# Patient Record
Sex: FEMALE | Race: WHITE | Employment: OTHER | URBAN - NONMETROPOLITAN AREA
[De-identification: names, ages, dates, MRNs, and addresses within clinical notes are randomized per-mention and may not be internally consistent; named-entity substitution may affect disease eponyms.]

---

## 2017-01-05 LAB
BUN BLDV-MCNC: NORMAL MG/DL
CALCIUM SERPL-MCNC: NORMAL MG/DL
CHLORIDE BLD-SCNC: NORMAL MMOL/L
CHOLESTEROL, TOTAL: 160 MG/DL
CHOLESTEROL/HDL RATIO: 2.4
CO2: NORMAL MMOL/L
CREAT SERPL-MCNC: NORMAL MG/DL
GFR CALCULATED: NORMAL
GLUCOSE BLD-MCNC: 93 MG/DL
HDLC SERPL-MCNC: 68 MG/DL (ref 35–70)
LDL CHOLESTEROL CALCULATED: 69.2 MG/DL (ref 0–160)
POTASSIUM SERPL-SCNC: NORMAL MMOL/L
SODIUM BLD-SCNC: NORMAL MMOL/L
TRIGL SERPL-MCNC: 114 MG/DL
VLDLC SERPL CALC-MCNC: 23 MG/DL

## 2017-08-29 VITALS
DIASTOLIC BLOOD PRESSURE: 82 MMHG | HEART RATE: 74 BPM | SYSTOLIC BLOOD PRESSURE: 126 MMHG | HEIGHT: 61 IN | WEIGHT: 225 LBS | BODY MASS INDEX: 42.48 KG/M2

## 2017-08-29 DIAGNOSIS — I10 UNSPECIFIED ESSENTIAL HYPERTENSION: ICD-10-CM

## 2017-08-29 DIAGNOSIS — M1A.9XX1 TOPHI: ICD-10-CM

## 2017-08-29 PROBLEM — E78.5 HYPERLIPIDEMIA: Status: ACTIVE | Noted: 2017-08-29

## 2017-08-29 PROBLEM — E03.9 HYPOTHYROIDISM: Status: ACTIVE | Noted: 2017-08-29

## 2017-08-29 PROBLEM — R32 URINARY INCONTINENCE: Status: ACTIVE | Noted: 2017-08-29

## 2017-08-29 RX ORDER — ASPIRIN 325 MG
325 TABLET ORAL DAILY
COMMUNITY
End: 2020-06-17

## 2017-08-29 RX ORDER — QUINAPRIL HCL AND HYDROCHLOROTHIAZIDE 20; 25 MG/1; MG/1
1 TABLET ORAL 2 TIMES DAILY
COMMUNITY
End: 2017-09-01 | Stop reason: SDUPTHER

## 2017-08-29 RX ORDER — IBUPROFEN 200 MG
1 CAPSULE ORAL DAILY
COMMUNITY
End: 2021-04-29

## 2017-08-29 RX ORDER — DULOXETIN HYDROCHLORIDE 60 MG/1
60 CAPSULE, DELAYED RELEASE ORAL DAILY
COMMUNITY
End: 2018-01-03 | Stop reason: SDUPTHER

## 2017-08-29 RX ORDER — ATORVASTATIN CALCIUM 10 MG/1
10 TABLET, FILM COATED ORAL DAILY
COMMUNITY
End: 2017-12-11 | Stop reason: SDUPTHER

## 2017-08-29 RX ORDER — MECLIZINE HYDROCHLORIDE 25 MG/1
25 TABLET ORAL PRN
COMMUNITY
End: 2021-01-14 | Stop reason: SDUPTHER

## 2017-08-29 RX ORDER — ALLOPURINOL 300 MG/1
300 TABLET ORAL DAILY
COMMUNITY
End: 2017-09-01

## 2017-08-29 RX ORDER — SENNOSIDES 8.6 MG
650 CAPSULE ORAL EVERY 8 HOURS PRN
COMMUNITY
End: 2017-09-01

## 2017-08-29 RX ORDER — LEVOTHYROXINE SODIUM 0.1 MG/1
100 TABLET ORAL DAILY
COMMUNITY
End: 2017-09-01 | Stop reason: SDUPTHER

## 2017-09-01 ENCOUNTER — OFFICE VISIT (OUTPATIENT)
Dept: FAMILY MEDICINE CLINIC | Age: 82
End: 2017-09-01
Payer: MEDICARE

## 2017-09-01 VITALS
DIASTOLIC BLOOD PRESSURE: 80 MMHG | BODY MASS INDEX: 42.89 KG/M2 | HEART RATE: 62 BPM | SYSTOLIC BLOOD PRESSURE: 114 MMHG | WEIGHT: 227 LBS

## 2017-09-01 DIAGNOSIS — M1A.09X0 CHRONIC GOUT OF MULTIPLE SITES, UNSPECIFIED CAUSE: ICD-10-CM

## 2017-09-01 DIAGNOSIS — Z13.1 SCREENING FOR DIABETES MELLITUS: Primary | ICD-10-CM

## 2017-09-01 DIAGNOSIS — I10 ESSENTIAL HYPERTENSION: ICD-10-CM

## 2017-09-01 DIAGNOSIS — E03.9 HYPOTHYROIDISM (ACQUIRED): ICD-10-CM

## 2017-09-01 DIAGNOSIS — G45.9 TRANSIENT CEREBRAL ISCHEMIA, UNSPECIFIED TYPE: ICD-10-CM

## 2017-09-01 DIAGNOSIS — M1A.9XX1 TOPHI: ICD-10-CM

## 2017-09-01 PROCEDURE — 1123F ACP DISCUSS/DSCN MKR DOCD: CPT | Performed by: FAMILY MEDICINE

## 2017-09-01 PROCEDURE — 4040F PNEUMOC VAC/ADMIN/RCVD: CPT | Performed by: FAMILY MEDICINE

## 2017-09-01 PROCEDURE — G0008 ADMIN INFLUENZA VIRUS VAC: HCPCS | Performed by: FAMILY MEDICINE

## 2017-09-01 PROCEDURE — 1036F TOBACCO NON-USER: CPT | Performed by: FAMILY MEDICINE

## 2017-09-01 PROCEDURE — G8427 DOCREV CUR MEDS BY ELIG CLIN: HCPCS | Performed by: FAMILY MEDICINE

## 2017-09-01 PROCEDURE — G8400 PT W/DXA NO RESULTS DOC: HCPCS | Performed by: FAMILY MEDICINE

## 2017-09-01 PROCEDURE — 99214 OFFICE O/P EST MOD 30 MIN: CPT | Performed by: FAMILY MEDICINE

## 2017-09-01 PROCEDURE — 1090F PRES/ABSN URINE INCON ASSESS: CPT | Performed by: FAMILY MEDICINE

## 2017-09-01 PROCEDURE — G8417 CALC BMI ABV UP PARAM F/U: HCPCS | Performed by: FAMILY MEDICINE

## 2017-09-01 PROCEDURE — 90662 IIV NO PRSV INCREASED AG IM: CPT | Performed by: FAMILY MEDICINE

## 2017-09-01 RX ORDER — LEVOTHYROXINE SODIUM 0.1 MG/1
100 TABLET ORAL DAILY
Qty: 90 TABLET | Refills: 3 | Status: SHIPPED | OUTPATIENT
Start: 2017-09-01 | End: 2018-03-26 | Stop reason: SDUPTHER

## 2017-09-01 RX ORDER — COLCHICINE 0.6 MG/1
0.6 CAPSULE ORAL DAILY
Qty: 30 CAPSULE | Refills: 5 | Status: SHIPPED | OUTPATIENT
Start: 2017-09-01 | End: 2018-11-09

## 2017-09-01 RX ORDER — QUINAPRIL HCL AND HYDROCHLOROTHIAZIDE 20; 25 MG/1; MG/1
1 TABLET ORAL 2 TIMES DAILY
Qty: 180 TABLET | Refills: 3 | Status: SHIPPED | OUTPATIENT
Start: 2017-09-01 | End: 2017-10-18 | Stop reason: DRUGHIGH

## 2017-09-01 ASSESSMENT — PATIENT HEALTH QUESTIONNAIRE - PHQ9
1. LITTLE INTEREST OR PLEASURE IN DOING THINGS: 0
SUM OF ALL RESPONSES TO PHQ QUESTIONS 1-9: 0
2. FEELING DOWN, DEPRESSED OR HOPELESS: 0
SUM OF ALL RESPONSES TO PHQ9 QUESTIONS 1 & 2: 0

## 2017-09-01 ASSESSMENT — ENCOUNTER SYMPTOMS
SHORTNESS OF BREATH: 0
BLURRED VISION: 0

## 2017-10-18 ENCOUNTER — OFFICE VISIT (OUTPATIENT)
Dept: FAMILY MEDICINE CLINIC | Age: 82
End: 2017-10-18
Payer: MEDICARE

## 2017-10-18 VITALS
HEIGHT: 62 IN | BODY MASS INDEX: 43.43 KG/M2 | HEART RATE: 107 BPM | TEMPERATURE: 98.2 F | WEIGHT: 236 LBS | SYSTOLIC BLOOD PRESSURE: 104 MMHG | DIASTOLIC BLOOD PRESSURE: 70 MMHG

## 2017-10-18 DIAGNOSIS — N95.0 POSTMENOPAUSAL BLEEDING: Primary | ICD-10-CM

## 2017-10-18 DIAGNOSIS — R42 DIZZINESS: ICD-10-CM

## 2017-10-18 DIAGNOSIS — I10 ESSENTIAL HYPERTENSION: ICD-10-CM

## 2017-10-18 LAB
BASOPHILS # BLD: 0.12 THOU/MM3
BASOPHILS ABSOLUTE: ABNORMAL /ΜL
BASOPHILS RELATIVE PERCENT: ABNORMAL %
DIFFERENTIAL: AUTOMATED DIFF
EOSINOPHIL # BLD: 0.16 THOU/MM3
EOSINOPHILS ABSOLUTE: ABNORMAL /ΜL
EOSINOPHILS RELATIVE PERCENT: ABNORMAL %
HCT VFR BLD CALC: 51.2 %
HCT VFR BLD CALC: 51.2 % (ref 36–46)
HEMOGLOBIN: 16 G/DL
HEMOGLOBIN: 16 G/DL (ref 12–16)
LYMPHOCYTES # BLD: 2.74 THOU/MM3
LYMPHOCYTES ABSOLUTE: ABNORMAL /ΜL
LYMPHOCYTES RELATIVE PERCENT: ABNORMAL %
MCH RBC QN AUTO: 28.3 PG
MCH RBC QN AUTO: 28.5 PG
MCHC RBC AUTO-ENTMCNC: 31.3 G/DL
MCHC RBC AUTO-ENTMCNC: 31.3 G/DL
MCV RBC AUTO: 91 FL
MCV RBC AUTO: 91 FL
MONOCYTES # BLD: 0.83 THOU/MM3
MONOCYTES ABSOLUTE: ABNORMAL /ΜL
MONOCYTES RELATIVE PERCENT: ABNORMAL %
NEUTROPHILS ABSOLUTE: ABNORMAL /ΜL
NEUTROPHILS RELATIVE PERCENT: ABNORMAL %
NEUTROPHILS: 4.04 THOU/MM3
PDW BLD-RTO: 13.3 %
PDW BLD-RTO: ABNORMAL %
PLATELET # BLD: 434 K/ΜL
PLATELET # BLD: 434 THOU/MM3
PMV BLD AUTO: 6.4 FL
PMV BLD AUTO: ABNORMAL FL
RBC # BLD: 5.62 10^6/ΜL
RBC # BLD: 5.62 M/UL
WBC # BLD: 7.9 10^3/ML
WBC # BLD: 7.9 THOU/ML3

## 2017-10-18 PROCEDURE — 1123F ACP DISCUSS/DSCN MKR DOCD: CPT | Performed by: FAMILY MEDICINE

## 2017-10-18 PROCEDURE — G8400 PT W/DXA NO RESULTS DOC: HCPCS | Performed by: FAMILY MEDICINE

## 2017-10-18 PROCEDURE — 1036F TOBACCO NON-USER: CPT | Performed by: FAMILY MEDICINE

## 2017-10-18 PROCEDURE — 4040F PNEUMOC VAC/ADMIN/RCVD: CPT | Performed by: FAMILY MEDICINE

## 2017-10-18 PROCEDURE — G8427 DOCREV CUR MEDS BY ELIG CLIN: HCPCS | Performed by: FAMILY MEDICINE

## 2017-10-18 PROCEDURE — G8484 FLU IMMUNIZE NO ADMIN: HCPCS | Performed by: FAMILY MEDICINE

## 2017-10-18 PROCEDURE — G8417 CALC BMI ABV UP PARAM F/U: HCPCS | Performed by: FAMILY MEDICINE

## 2017-10-18 PROCEDURE — 1090F PRES/ABSN URINE INCON ASSESS: CPT | Performed by: FAMILY MEDICINE

## 2017-10-18 PROCEDURE — 99214 OFFICE O/P EST MOD 30 MIN: CPT | Performed by: FAMILY MEDICINE

## 2017-10-18 RX ORDER — QUINAPRIL HCL AND HYDROCHLOROTHIAZIDE 20; 25 MG/1; MG/1
1 TABLET ORAL DAILY
Qty: 180 TABLET | Refills: 3 | Status: SHIPPED
Start: 2017-10-18 | End: 2017-11-02 | Stop reason: SINTOL

## 2017-10-18 NOTE — PROGRESS NOTES
 Alcohol use Yes      Comment: socially      Current Outpatient Prescriptions   Medication Sig Dispense Refill    quinapril-hydrochlorothiazide (ACCURETIC) 20-25 MG per tablet Take 1 tablet by mouth daily 180 tablet 3    Omega-3 Fatty Acids (FISH OIL PO) Take 2 g by mouth      levothyroxine (SYNTHROID) 100 MCG tablet Take 1 tablet by mouth Daily 90 tablet 3    atorvastatin (LIPITOR) 10 MG tablet Take 10 mg by mouth daily      calcium carbonate (OYSTER SHELL CALCIUM 500 MG) 1250 (500 Ca) MG tablet Take 1 tablet by mouth daily      Multiple Vitamins-Minerals (CENTRUM SILVER PO) Take by mouth daily      aspirin 325 MG tablet Take 325 mg by mouth daily      meclizine (ANTIVERT) 25 MG tablet Take 25 mg by mouth as needed      DULoxetine (CYMBALTA) 60 MG extended release capsule Take 60 mg by mouth daily      Colchicine 0.6 MG CAPS Take 1 capsule by mouth daily 30 capsule 5     No current facility-administered medications for this visit. Allergies   Allergen Reactions    Tramadol      dizziness    Vesicare [Solifenacin]      Swollen fingers       Health Maintenance   Topic Date Due    DTaP/Tdap/Td vaccine (1 - Tdap) 07/28/1953    Zostavax vaccine  Completed    DEXA (modify frequency per FRAX score)  Completed    Flu vaccine  Completed    Pneumococcal low/med risk  Completed       Subjective:      Review of Systems   Constitutional: Negative for activity change, appetite change, chills, fatigue and unexpected weight change. Respiratory: Negative for chest tightness and shortness of breath. Cardiovascular: Negative for chest pain and leg swelling. Gastrointestinal: Negative for abdominal distention, abdominal pain, blood in stool, constipation, diarrhea and nausea. Genitourinary: Positive for vaginal bleeding. Negative for vaginal discharge.      Genitourinary: Positive for pelvic pain (crampy) and vaginal bleeding (abnormal vaginal bleeding, happened a few months back for a short time and just recently started up again with sometimes more than just spotting). Objective:     /70   Pulse 107   Temp 98.2 °F (36.8 °C) (Tympanic)   Ht 5' 2\" (1.575 m)   Wt 236 lb (107 kg)   PF 96 L/min   BMI 43.16 kg/m²     Physical Exam   Constitutional: She is oriented to person, place, and time. She appears well-developed and well-nourished. HENT:   Head: Normocephalic and atraumatic. Eyes: Conjunctivae and EOM are normal.   Neck: Normal range of motion. Neck supple. No JVD present. No thyromegaly present. Cardiovascular: Normal rate, regular rhythm and intact distal pulses. Exam reveals no gallop and no friction rub. No murmur heard. Pulmonary/Chest: Effort normal and breath sounds normal. No respiratory distress. Abdominal: Soft. She exhibits no distension and no mass. There is tenderness. There is no rebound and no guarding. Minimal suprapubic tenderness noted   Genitourinary: Vagina normal. There is no rash or lesion on the right labia. There is no rash or lesion on the left labia. No erythema, tenderness or bleeding in the vagina. No vaginal discharge found. Genitourinary Comments: No CMT, no obvious masses or polyps. Exam limited by body habitus but no discrete masses on the uterus, not enlarged, freely mobile and no adnexal masses or tenderness. No inguinal LAD noted   Musculoskeletal: She exhibits no edema. Many gouty Tophi noted on the hands   Lymphadenopathy:     She has no cervical adenopathy. Neurological: She is alert and oriented to person, place, and time. Skin: Skin is warm. Psychiatric: She has a normal mood and affect. Her behavior is normal. Judgment and thought content normal.   Nursing note and vitals reviewed. Assessment/Plan:     1. Postmenopausal bleeding  US Pelvis Complete    CBC Auto Differential- check in light of the dizziness and the bleed   2.  Essential hypertension  quinapril-hydrochlorothiazide (ACCURETIC) 20-25 MG per tablet- controlled,

## 2017-10-19 DIAGNOSIS — I10 ESSENTIAL HYPERTENSION: ICD-10-CM

## 2017-10-19 DIAGNOSIS — N95.0 POSTMENOPAUSAL BLEEDING: ICD-10-CM

## 2017-10-19 DIAGNOSIS — E03.9 HYPOTHYROIDISM (ACQUIRED): ICD-10-CM

## 2017-10-19 DIAGNOSIS — Z13.1 SCREENING FOR DIABETES MELLITUS: ICD-10-CM

## 2017-10-20 ENCOUNTER — TELEPHONE (OUTPATIENT)
Dept: FAMILY MEDICINE CLINIC | Age: 82
End: 2017-10-20

## 2017-10-20 DIAGNOSIS — N95.0 POSTMENOPAUSAL BLEEDING: Primary | ICD-10-CM

## 2017-10-20 DIAGNOSIS — R93.89 THICKENED ENDOMETRIUM: ICD-10-CM

## 2017-10-20 ASSESSMENT — ENCOUNTER SYMPTOMS
DIARRHEA: 0
ABDOMINAL PAIN: 0
ABDOMINAL DISTENTION: 0
BLOOD IN STOOL: 0
CHEST TIGHTNESS: 0
SHORTNESS OF BREATH: 0
CONSTIPATION: 0
NAUSEA: 0

## 2017-10-23 ENCOUNTER — TELEPHONE (OUTPATIENT)
Dept: FAMILY MEDICINE CLINIC | Age: 82
End: 2017-10-23

## 2017-10-23 NOTE — PROGRESS NOTES
I called Dr [facility] office and attempted to speak with her nurse but only got Nakita Ferguson LPN's voice mail. I left a detailed VM explaining  we needed the pt to be see by a local GYN provider for an endometrial bx and then if the bx was positive for cancer the pt could be referred back to us. I provded our office contact information for Nakita Ferguson to call our office if she had any further questions. I also left a VM for Julia Hughes the supervisor in scheduling that the pt should not be scheduled with our office at this time.

## 2017-10-23 NOTE — TELEPHONE ENCOUNTER
Tasha Guerra called waiting for a return call regarding who [facility] needs to see.      [phone]

## 2017-10-23 NOTE — TELEPHONE ENCOUNTER
Dr. Magdalena Lovelace is requesting that Paola Mendez have an endometrial biopsy by a regular Gynecologist before they see her due to Dr. Magdalena Lovelace having a limited schedule since she cut back her hours

## 2017-10-24 ENCOUNTER — OFFICE VISIT (OUTPATIENT)
Dept: FAMILY MEDICINE CLINIC | Age: 82
End: 2017-10-24

## 2017-10-24 VITALS
OXYGEN SATURATION: 97 % | DIASTOLIC BLOOD PRESSURE: 60 MMHG | SYSTOLIC BLOOD PRESSURE: 92 MMHG | WEIGHT: 229 LBS | BODY MASS INDEX: 41.88 KG/M2 | TEMPERATURE: 97.6 F | HEART RATE: 128 BPM

## 2017-10-24 DIAGNOSIS — E03.9 HYPOTHYROIDISM (ACQUIRED): ICD-10-CM

## 2017-10-24 DIAGNOSIS — R58 HYPOTENSION DUE TO BLOOD LOSS: ICD-10-CM

## 2017-10-24 DIAGNOSIS — I10 ESSENTIAL HYPERTENSION: ICD-10-CM

## 2017-10-24 DIAGNOSIS — I95.89 HYPOTENSION DUE TO BLOOD LOSS: ICD-10-CM

## 2017-10-24 DIAGNOSIS — N95.0 POSTMENOPAUSAL BLEEDING: Primary | ICD-10-CM

## 2017-10-24 DIAGNOSIS — R42 DIZZINESS: ICD-10-CM

## 2017-10-24 DIAGNOSIS — R93.89 THICKENED ENDOMETRIUM: ICD-10-CM

## 2017-10-24 LAB
AGE FOR GFR: 83
ANION GAP SERPL CALCULATED.3IONS-SCNC: 13 MMOL/L
APPEARANCE: ABNORMAL
BACTERIA: ABNORMAL 1HPF
BASOPHILS # BLD: 0.08 THOU/MM3
BILIRUBIN: ABNORMAL
BLOOD: ABNORMAL
BUN BLDV-MCNC: 18 MG/DL
CALCIUM SERPL-MCNC: 9.4 MG/DL
CASTS: ABNORMAL /LPF
CHLORIDE BLD-SCNC: 99 MMOL/L
CO2: 26 MMOL/L
COLOR: ABNORMAL
CREAT SERPL-MCNC: 0.7 MG/DL
CRYSTALS: ABNORMAL /HPF
DIFFERENTIAL: AUTOMATED DIFF
EGFR BF: 97 ML/MIN/1.73 M2
EGFR BM: 131 ML/MIN/1.73 M2
EGFR WF: 80 ML/MIN/1.73 M2
EGFR WM: 108 ML/MIN/1.73 M2
EOSINOPHIL # BLD: 0.08 THOU/MM3
EPITHELIAL CELLS, UA: ABNORMAL /HPF
GLUCOSE: 103 MG/DL
GLUCOSE: ABNORMAL MG/DL
HCT VFR BLD CALC: 37.4 %
HEMOGLOBIN: 12.3 G/DL
KETONES: ABNORMAL MG/DL
LEUKOCYTES, UA: ABNORMAL
LYMPHOCYTES # BLD: 1.1 THOU/MM3
MCH RBC QN AUTO: 29.5 PG
MCHC RBC AUTO-ENTMCNC: 32.8 G/DL
MCV RBC AUTO: 89.7 FL
MICROSCOPIC URINE: ABNORMAL
MONOCYTES # BLD: 0.5 THOU/MM3
MUCUS: ABNORMAL /HPF
NEUTROPHILS: 4.43 THOU/MM3
NITRITE, URINE: ABNORMAL
PDW BLD-RTO: 13 %
PH: 6 PH
PLATELET # BLD: 362 THOU/MM3
PMV BLD AUTO: 7 FL
POTASSIUM SERPL-SCNC: 3.7 MMOL/L
PROTEIN,SCREEN: ABNORMAL MG/DL
RBC # BLD: 4.17 M/UL
RBC: ABNORMAL /HPF
SODIUM BLD-SCNC: 134 MMOL/L
SPECIFIC GRAVITY, URINE: 1.01 MG/DL
URINE CULTURE, ROUTINE: NORMAL
UROBILINOGEN, URINE: 0.2 MG/DL
WBC # BLD: 6.19 THOU/ML3
WBC URINE: ABNORMAL
YEAST: ABNORMAL /HPF

## 2017-10-24 ASSESSMENT — ENCOUNTER SYMPTOMS
NAUSEA: 0
COUGH: 0
ABDOMINAL PAIN: 0
DIARRHEA: 0
CHEST TIGHTNESS: 0
CONSTIPATION: 0
SHORTNESS OF BREATH: 0

## 2017-10-24 NOTE — PROGRESS NOTES
She has tried nothing for the symptoms. There is no history of recurrent UTIs. BP Readings from Last 3 Encounters:   10/24/17 92/60   10/18/17 104/70   09/01/17 114/80          (goal 120/80)    Past Medical History:   Diagnosis Date    Goiter 2004    CT    HTN (hypertension)     Hyperlipidemia     Hypothyroidism (acquired)     TIA (transient ischemic attack)       Past Surgical History:   Procedure Laterality Date    BREAST BIOPSY Right 10/2008    benign    CATARACT REMOVAL Left 07/2010    COLONOSCOPY  2007    normal    FOOT SURGERY      bunions and hammertoes    KNEE ARTHROPLASTY Bilateral     LEEP  2002       Family History   Problem Relation Age of Onset    Other Other      polycythemia vera    Other Daughter      polycystic ovary       Social History   Substance Use Topics    Smoking status: Never Smoker    Smokeless tobacco: Never Used    Alcohol use Yes      Comment: socially      Current Outpatient Prescriptions   Medication Sig Dispense Refill    quinapril-hydrochlorothiazide (ACCURETIC) 20-25 MG per tablet Take 1 tablet by mouth daily 180 tablet 3    Omega-3 Fatty Acids (FISH OIL PO) Take 2 g by mouth      levothyroxine (SYNTHROID) 100 MCG tablet Take 1 tablet by mouth Daily 90 tablet 3    Colchicine 0.6 MG CAPS Take 1 capsule by mouth daily 30 capsule 5    atorvastatin (LIPITOR) 10 MG tablet Take 10 mg by mouth daily      calcium carbonate (OYSTER SHELL CALCIUM 500 MG) 1250 (500 Ca) MG tablet Take 1 tablet by mouth daily      Multiple Vitamins-Minerals (CENTRUM SILVER PO) Take by mouth daily      aspirin 325 MG tablet Take 325 mg by mouth daily      meclizine (ANTIVERT) 25 MG tablet Take 25 mg by mouth as needed      DULoxetine (CYMBALTA) 60 MG extended release capsule Take 60 mg by mouth daily       No current facility-administered medications for this visit.       Allergies   Allergen Reactions    Tramadol      dizziness    Vesicare [Solifenacin]      Swollen fingers Health Maintenance   Topic Date Due    DTaP/Tdap/Td vaccine (1 - Tdap) 07/28/1953    Zostavax vaccine  Completed    DEXA (modify frequency per FRAX score)  Completed    Flu vaccine  Completed    Pneumococcal low/med risk  Completed       Subjective:      Review of Systems   Constitutional: Positive for diaphoresis and fatigue. Negative for appetite change, chills, fever and unexpected weight change. Respiratory: Negative for cough, chest tightness and shortness of breath. Cardiovascular: Positive for palpitations. Negative for chest pain and leg swelling. Can tell her heart is going a bit faster than normal   Gastrointestinal: Negative for abdominal pain, constipation, diarrhea and nausea. Genitourinary: Positive for flank pain, frequency, hematuria and vaginal bleeding. Negative for vaginal discharge. Hematological: Does not bruise/bleed easily. no history of anesthetic reactions or previous bleeding issues. No history of blood clotting or DVT. Objective:     BP 92/60 (Site: Left Arm, Position: Standing)   Pulse 128   Temp 97.6 °F (36.4 °C) (Tympanic)   Wt 229 lb (103.9 kg)   SpO2 97%   BMI 41.88 kg/m²     Physical Exam   Constitutional: She is oriented to person, place, and time. She appears well-developed and well-nourished. HENT:   Head: Normocephalic and atraumatic. Eyes: Conjunctivae and EOM are normal.   Neck: Normal range of motion. Neck supple. No JVD present. No thyromegaly present. Cardiovascular: Normal rate, regular rhythm and intact distal pulses. Exam reveals no gallop and no friction rub. No murmur heard. Pulmonary/Chest: Effort normal and breath sounds normal. No respiratory distress. Abdominal: Soft. She exhibits no distension and no mass. There is tenderness. There is no rebound and no guarding. Minimal suprapubic tenderness noted, no CVA tenderness or guarding.  No focal tenderness on the flank with palpation   Genitourinary: Vagina normal. There given educational materials - see patient instructions. Discussed use, benefit, and side effects of prescribed medications. All patient questions answered. Pt voiced understanding. Reviewed health maintenance. Instructed to continue current medications, diet and exercise. Patient agreed with treatment plan. Follow up as directed.      Electronically signed by Gisell Forrest MD on 10/24/2017

## 2017-10-26 LAB
AGE FOR GFR: 83
ANION GAP SERPL CALCULATED.3IONS-SCNC: 10 MMOL/L
BASOPHILS # BLD: 0.02 THOU/MM3
BUN BLDV-MCNC: 19 MG/DL
CHLORIDE BLD-SCNC: 104 MMOL/L
CO2: 27 MMOL/L
CREAT SERPL-MCNC: 0.6 MG/DL
DIFFERENTIAL: AUTOMATED DIFF
EGFR BF: 116 ML/MIN/1.73 M2
EGFR BM: 156 ML/MIN/1.73 M2
EGFR WF: 95 ML/MIN/1.73 M2
EGFR WM: 129 ML/MIN/1.73 M2
EOSINOPHIL # BLD: 0 THOU/MM3
HCT VFR BLD CALC: 32.2 %
HEMOGLOBIN: 10.6 G/DL
LYMPHOCYTES # BLD: 1.36 THOU/MM3
MCH RBC QN AUTO: 29.4 PG
MCHC RBC AUTO-ENTMCNC: 33.1 G/DL
MCV RBC AUTO: 88.9 FL
MONOCYTES # BLD: 0.47 THOU/MM3
NEUTROPHILS: 4.84 THOU/MM3
PDW BLD-RTO: 12.9 %
PLATELET # BLD: 325 THOU/MM3
PMV BLD AUTO: 6.5 FL
POTASSIUM SERPL-SCNC: 3.8 MMOL/L
RBC # BLD: 3.62 M/UL
SODIUM BLD-SCNC: 137 MMOL/L
WBC # BLD: 6.7 THOU/ML3

## 2017-10-27 ENCOUNTER — TELEPHONE (OUTPATIENT)
Dept: FAMILY MEDICINE CLINIC | Age: 82
End: 2017-10-27

## 2017-10-27 NOTE — TELEPHONE ENCOUNTER
Eliot Kirk called to get a hospital fu appointment with Dr Ed Drake. It is scheduled for 11/2/17 at 10:45. She will need a TCM call. [address] d/c 10/26/17.  Had D&C??

## 2017-10-31 ENCOUNTER — TELEPHONE (OUTPATIENT)
Dept: FAMILY MEDICINE CLINIC | Age: 82
End: 2017-10-31

## 2017-10-31 NOTE — TELEPHONE ENCOUNTER
Was wondering if she should take her ATB for 5 days or 10 days stated that she has enough for about 5 more days, she thought for 5 days

## 2017-11-02 ENCOUNTER — OFFICE VISIT (OUTPATIENT)
Dept: FAMILY MEDICINE CLINIC | Age: 82
End: 2017-11-02
Payer: MEDICARE

## 2017-11-02 VITALS
TEMPERATURE: 97.1 F | DIASTOLIC BLOOD PRESSURE: 74 MMHG | SYSTOLIC BLOOD PRESSURE: 118 MMHG | BODY MASS INDEX: 41.34 KG/M2 | OXYGEN SATURATION: 96 % | WEIGHT: 226 LBS | HEART RATE: 86 BPM

## 2017-11-02 DIAGNOSIS — I10 ESSENTIAL HYPERTENSION: ICD-10-CM

## 2017-11-02 DIAGNOSIS — N95.0 POSTMENOPAUSAL VAGINAL BLEEDING: Primary | ICD-10-CM

## 2017-11-02 DIAGNOSIS — N39.41 URGE INCONTINENCE OF URINE: ICD-10-CM

## 2017-11-02 DIAGNOSIS — D50.0 ANEMIA, BLOOD LOSS: ICD-10-CM

## 2017-11-02 PROCEDURE — 99495 TRANSJ CARE MGMT MOD F2F 14D: CPT | Performed by: FAMILY MEDICINE

## 2017-11-02 RX ORDER — QUINAPRIL 10 MG/1
10 TABLET ORAL DAILY
Qty: 30 TABLET | Refills: 3 | Status: SHIPPED | OUTPATIENT
Start: 2017-11-02 | End: 2018-02-28 | Stop reason: SDUPTHER

## 2017-11-02 NOTE — PROGRESS NOTES
Transition of Care Note   HPI  Mabelshaun Lo presents for face-to-face visit 11/2/17 for follow up from hospitalization for postmenopausal bleeding and UTI. Initial discharge date: 10/26. Interim history: Has been doing well since she has been home. No fevers or chills, no further bleeding since home from the hospital at all. HAs been minimally dizzy on occasion but if she stands slow she is fine    Date of Post Discharge communication contact: 11/1    Communication within 2 business dates of Initial Discharge (via direct contact, phone, or electronic OR 2 documented attempts unsuccessful:   [] No   [x] Yes     Persons at visit: daughter and Patient    Activity: activity as tolerated    Any medication or treatment changes since post-hospitalization phone call? [] No   [] Yes        Any further education needed on medications/treatment plan? [] No   [] Yes       All Active Meds in Chart - may or may not be currently taking post-hospital  Current Outpatient Prescriptions   Medication Sig Dispense Refill    quinapril (ACCUPRIL) 10 MG tablet Take 1 tablet by mouth daily 30 tablet 3    levothyroxine (SYNTHROID) 100 MCG tablet Take 1 tablet by mouth Daily 90 tablet 3    Colchicine 0.6 MG CAPS Take 1 capsule by mouth daily 30 capsule 5    atorvastatin (LIPITOR) 10 MG tablet Take 10 mg by mouth daily      calcium carbonate (OYSTER SHELL CALCIUM 500 MG) 1250 (500 Ca) MG tablet Take 1 tablet by mouth daily      Multiple Vitamins-Minerals (CENTRUM SILVER PO) Take by mouth daily      meclizine (ANTIVERT) 25 MG tablet Take 25 mg by mouth as needed      DULoxetine (CYMBALTA) 60 MG extended release capsule Take 60 mg by mouth daily      Omega-3 Fatty Acids (FISH OIL PO) Take 2 g by mouth      aspirin 325 MG tablet Take 325 mg by mouth daily       No current facility-administered medications for this visit.         Current Medications (meds in record marked as taking per [facility] phone call)  Outpatient Prescriptions Dr. Wade Lozada for this as needed        Outstanding results or need for additional testing/tratment/referral resources:   [] No   [x] Yes   Is supposed to return to Wade Barker to discuss possilby cystocele repair  Requested/reviewed: Yes    Disease Education:  None      [address] :  None      Note:  CPT Coding - [x] Moderate Complexity: seen within 7-14 business days (63497)                                      [] Severe Complexity: seen within 7 business days (82591)

## 2017-11-10 ENCOUNTER — TELEPHONE (OUTPATIENT)
Dept: FAMILY MEDICINE CLINIC | Age: 82
End: 2017-11-10

## 2017-11-10 NOTE — TELEPHONE ENCOUNTER
Finished aTB for UTI last Sunday, now noticed a little blood during the night a couple times. Not as bad this am.  Wonders if needs more ATB or what you would like to do.     Did have D&C couple weeks ago

## 2017-11-10 NOTE — TELEPHONE ENCOUNTER
Needs a UA before the weekend-- can come to walkins in the morning or to the lab-- EARLY this afternoon

## 2017-11-11 ENCOUNTER — OFFICE VISIT (OUTPATIENT)
Dept: FAMILY MEDICINE CLINIC | Age: 82
End: 2017-11-11
Payer: MEDICARE

## 2017-11-11 VITALS
WEIGHT: 231 LBS | HEART RATE: 88 BPM | DIASTOLIC BLOOD PRESSURE: 68 MMHG | TEMPERATURE: 98.8 F | SYSTOLIC BLOOD PRESSURE: 128 MMHG | BODY MASS INDEX: 42.25 KG/M2

## 2017-11-11 DIAGNOSIS — N39.0 RECURRENT UTI: ICD-10-CM

## 2017-11-11 DIAGNOSIS — N81.10 BLADDER PROLAPSE, FEMALE, ACQUIRED: ICD-10-CM

## 2017-11-11 DIAGNOSIS — R31.9 HEMATURIA, UNSPECIFIED TYPE: Primary | ICD-10-CM

## 2017-11-11 LAB
BILIRUBIN, POC: NEGATIVE
BLOOD URINE, POC: ABNORMAL
CLARITY, POC: ABNORMAL
COLOR, POC: YELLOW
GLUCOSE URINE, POC: NEGATIVE
KETONES, POC: NEGATIVE
LEUKOCYTE EST, POC: NEGATIVE
NITRITE, POC: ABNORMAL
PH, POC: 7
PROTEIN, POC: ABNORMAL
SPECIFIC GRAVITY, POC: 1
UROBILINOGEN, POC: NEGATIVE

## 2017-11-11 PROCEDURE — 1090F PRES/ABSN URINE INCON ASSESS: CPT | Performed by: FAMILY MEDICINE

## 2017-11-11 PROCEDURE — G8484 FLU IMMUNIZE NO ADMIN: HCPCS | Performed by: FAMILY MEDICINE

## 2017-11-11 PROCEDURE — G8417 CALC BMI ABV UP PARAM F/U: HCPCS | Performed by: FAMILY MEDICINE

## 2017-11-11 PROCEDURE — 81002 URINALYSIS NONAUTO W/O SCOPE: CPT | Performed by: FAMILY MEDICINE

## 2017-11-11 PROCEDURE — 1036F TOBACCO NON-USER: CPT | Performed by: FAMILY MEDICINE

## 2017-11-11 PROCEDURE — 4040F PNEUMOC VAC/ADMIN/RCVD: CPT | Performed by: FAMILY MEDICINE

## 2017-11-11 PROCEDURE — G8427 DOCREV CUR MEDS BY ELIG CLIN: HCPCS | Performed by: FAMILY MEDICINE

## 2017-11-11 PROCEDURE — 1123F ACP DISCUSS/DSCN MKR DOCD: CPT | Performed by: FAMILY MEDICINE

## 2017-11-11 PROCEDURE — 99214 OFFICE O/P EST MOD 30 MIN: CPT | Performed by: FAMILY MEDICINE

## 2017-11-11 PROCEDURE — G8400 PT W/DXA NO RESULTS DOC: HCPCS | Performed by: FAMILY MEDICINE

## 2017-11-11 RX ORDER — SULFAMETHOXAZOLE AND TRIMETHOPRIM 800; 160 MG/1; MG/1
1 TABLET ORAL 2 TIMES DAILY
Qty: 20 TABLET | Refills: 0 | Status: SHIPPED | OUTPATIENT
Start: 2017-11-11 | End: 2017-11-21

## 2017-11-11 RX ORDER — NITROFURANTOIN 25; 75 MG/1; MG/1
100 CAPSULE ORAL DAILY
Qty: 30 CAPSULE | Refills: 2 | Status: SHIPPED | OUTPATIENT
Start: 2017-11-21 | End: 2018-02-01

## 2017-11-11 ASSESSMENT — ENCOUNTER SYMPTOMS
COUGH: 0
ABDOMINAL PAIN: 0
CHOKING: 0
DIARRHEA: 0
NAUSEA: 0
CONSTIPATION: 0
ANAL BLEEDING: 0
ABDOMINAL DISTENTION: 0
BLOOD IN STOOL: 0
SHORTNESS OF BREATH: 0

## 2017-11-11 NOTE — PATIENT INSTRUCTIONS
Will start on the Bactrim (sulfa) twice daily for 10 days. After this is complete then start on the macrodantin one daily until after the surgery for the bladder. Also start with \"double sitting\" for emptying the bladder.

## 2017-11-11 NOTE — PROGRESS NOTES
[address] [address] [address]  Dept: [phone]  Dept Fax: [phone]    Fanta Laura is a 80 y.o. female who presents today for her medical conditions/complaints as noted below. Fanta Laura is c/o of Urinary Tract Infection (Noticed blood in urine yesterday, Urine dip abnormal in office today, last ATB was taken on Sunday)      HPI:     Started with a few episodes of hematuria on wesds night but not today and none on Friday night. Nocturia an frequency have actually been better. Alittle cramping in her lower belly. Has an appt on Novemeber 30 to discuss a hyst and a bladder tie up. Has known bladder prolapse and uterine prolapse. Did finish the entire course of cephalexin and had minor itching on this. Noactual rash or hives at all on the antibiotics. Most recent culture showed Klebsiella pneumonia with sens to everything except amoxicillin      Urinary Tract Infection    This is a recurrent problem. The current episode started in the past 7 days (started with a small amount of of blood when she urinates- pink red-- this was on the toilet tissue. also saw a few bright red spots in her underclothes). The patient is experiencing no pain. There has been no fever. She is not sexually active. There is no history of pyelonephritis. Associated symptoms include hematuria. Pertinent negatives include no chills or nausea. She has tried nothing for the symptoms. Her past medical history is significant for catheterization and recurrent UTIs.        BP Readings from Last 3 Encounters:   11/11/17 128/68   11/02/17 118/74   10/24/17 92/60          (goal 120/80)    Past Medical History:   Diagnosis Date    Bladder prolapse, female, acquired     with uterine prolapse and urinary retention    Goiter 2004    CT    HTN (hypertension)     Hyperlipidemia     Hypothyroidism (acquired)     TIA (transient ischemic attack)       Past Surgical History: Completed    DEXA (modify frequency per FRAX score)  Completed    Flu vaccine  Completed    Pneumococcal low/med risk  Completed       Subjective:      Review of Systems   Constitutional: Negative for activity change, chills, fatigue, fever and unexpected weight change. Respiratory: Negative for cough, choking and shortness of breath. Gastrointestinal: Negative for abdominal distention, abdominal pain, anal bleeding, blood in stool, constipation, diarrhea and nausea. Genitourinary: Positive for hematuria. Negative for vaginal bleeding. Objective:     /68   Pulse 88   Temp 98.8 °F (37.1 °C)   Wt 231 lb (104.8 kg)   BMI 42.25 kg/m²     Physical Exam   Constitutional: She is oriented to person, place, and time. She appears well-developed and well-nourished. HENT:   Head: Normocephalic and atraumatic. Eyes: Conjunctivae and EOM are normal.   Neck: Normal range of motion. Neck supple. No JVD present. No thyromegaly present. Cardiovascular: Normal rate, regular rhythm and intact distal pulses. Exam reveals no gallop and no friction rub. No murmur heard. Pulmonary/Chest: Effort normal and breath sounds normal. No respiratory distress. Abdominal: Soft. She exhibits no distension and no mass. There is no tenderness. There is no rebound and no guarding. Genitourinary: No bleeding in the vagina. Musculoskeletal: She exhibits no edema. Many gouty Tophi noted on the hands   Lymphadenopathy:     She has no cervical adenopathy. Neurological: She is alert and oriented to person, place, and time. She displays normal reflexes. No cranial nerve deficit. She exhibits normal muscle tone. Coordination normal.   Skin: Skin is warm. Psychiatric: She has a normal mood and affect. Her behavior is normal. Judgment and thought content normal.   Nursing note and vitals reviewed. Assessment/Plan:     1. Hematuria, unspecified type  POCT Urinalysis no Micro   2.  Recurrent UTI  POCT Urinalysis no Micro    Urine Culture   3. Bladder prolapse, female, acquired       Patient Instructions   Will start on the Bactrim (sulfa) twice daily for 10 days. After this is complete then start on the macrodantin one daily until after the surgery for the bladder. Also start with \"double sitting\" for emptying the bladder. Lab Results   Component Value Date    WBC 6.70 10/26/2017    HGB 10.6 (L) 10/26/2017    HCT 32.2 (L) 10/26/2017     10/26/2017    CHOL 160 01/05/2017    TRIG 114 01/05/2017    HDL 68 01/05/2017    ALT 30 10/22/2017    AST 29 10/22/2017     10/26/2017    K 3.8 10/26/2017     10/26/2017    CREATININE 0.6 10/26/2017    BUN 19 (H) 10/26/2017    CO2 27 10/26/2017    INR 0.9 10/22/2017       No Follow-up on file. Patient given educational materials - see patient instructions. Discussed use, benefit, and side effects of prescribed medications. All patient questions answered. Pt voiced understanding. Reviewed health maintenance. Instructed to continue current medications, diet and exercise. Patient agreed with treatment plan. Follow up as directed.      Electronically signed by Anne Marie Loja MD on 11/11/2017

## 2017-11-13 ENCOUNTER — TELEPHONE (OUTPATIENT)
Dept: FAMILY MEDICINE CLINIC | Age: 82
End: 2017-11-13

## 2017-11-30 LAB
AGE FOR GFR: 83
ANION GAP SERPL CALCULATED.3IONS-SCNC: 21 MMOL/L
BASOPHILS # BLD: 0.08 THOU/MM3
BUN BLDV-MCNC: 18 MG/DL
CHLORIDE BLD-SCNC: 103 MMOL/L
CO2: 24 MMOL/L
CREAT SERPL-MCNC: 0.7 MG/DL
DIFFERENTIAL: AUTOMATED DIFF
EGFR BF: 97 ML/MIN/1.73 M2
EGFR BM: 131 ML/MIN/1.73 M2
EGFR WF: 80 ML/MIN/1.73 M2
EGFR WM: 108 ML/MIN/1.73 M2
EOSINOPHIL # BLD: 0.12 THOU/MM3
HCT VFR BLD CALC: 44 %
HEMOGLOBIN: 14.2 G/DL
LYMPHOCYTES # BLD: 2.16 THOU/MM3
MCH RBC QN AUTO: 28.8 PG
MCHC RBC AUTO-ENTMCNC: 32.3 G/DL
MCV RBC AUTO: 89.3 FL
MONOCYTES # BLD: 0.57 THOU/MM3
NEUTROPHILS: 2.15 THOU/MM3
PDW BLD-RTO: 13.4 %
PLATELET # BLD: 376 THOU/MM3
PMV BLD AUTO: 6.6 FL
POTASSIUM SERPL-SCNC: 4.7 MMOL/L
RBC # BLD: 4.93 M/UL
SODIUM BLD-SCNC: 143 MMOL/L
WBC # BLD: 5.08 THOU/ML3

## 2017-12-12 RX ORDER — ATORVASTATIN CALCIUM 10 MG/1
TABLET, FILM COATED ORAL
Qty: 90 TABLET | Refills: 3 | Status: SHIPPED | OUTPATIENT
Start: 2017-12-12 | End: 2018-03-26 | Stop reason: SDUPTHER

## 2018-01-03 NOTE — TELEPHONE ENCOUNTER
Keyanna Ortiz is calling to request a refill on the following medication(s):  Requested Prescriptions     Pending Prescriptions Disp Refills    DULoxetine (CYMBALTA) 60 MG extended release capsule 30 capsule      Sig: Take 1 capsule by mouth daily       Last Visit Date (If Applicable):  87/61/6889    Next Visit Date:    3/9/2018

## 2018-01-05 RX ORDER — DULOXETIN HYDROCHLORIDE 60 MG/1
60 CAPSULE, DELAYED RELEASE ORAL DAILY
Qty: 30 CAPSULE | Refills: 5 | Status: SHIPPED | OUTPATIENT
Start: 2018-01-05 | End: 2018-07-13 | Stop reason: SDUPTHER

## 2018-03-15 LAB
AGE FOR GFR: 83
ALT SERPL-CCNC: 26 UNITS/L
ANION GAP SERPL CALCULATED.3IONS-SCNC: 16 MMOL/L
AST SERPL-CCNC: 22 UNITS/L
BUN BLDV-MCNC: 21 MG/DL
CALCIUM SERPL-MCNC: 10 MG/DL
CHLORIDE BLD-SCNC: 102 MMOL/L
CHOLESTEROL/HDL RATIO: 2.5 RATIO
CHOLESTEROL: 145 MG/DL
CO2: 29 MMOL/L
CREAT SERPL-MCNC: 0.6 MG/DL
EGFR BF: 116 ML/MIN/1.73 M2
EGFR BM: 156 ML/MIN/1.73 M2
EGFR WF: 95 ML/MIN/1.73 M2
EGFR WM: 129 ML/MIN/1.73 M2
GLUCOSE: 89 MG/DL
HDL, DIRECT: 58 MG/DL
LDL CHOLESTEROL CALCULATED: 64.6 MG/DL
POTASSIUM SERPL-SCNC: 4.5 MMOL/L
SODIUM BLD-SCNC: 142 MMOL/L
T4 FREE: 1.05 NG/DL
TRIGL SERPL-MCNC: 112 MG/DL
TSH SERPL DL<=0.05 MIU/L-ACNC: 4.86 MIU/ML
VLDLC SERPL CALC-MCNC: 22 MG/DL

## 2018-03-26 ENCOUNTER — OFFICE VISIT (OUTPATIENT)
Dept: FAMILY MEDICINE CLINIC | Age: 83
End: 2018-03-26
Payer: MEDICARE

## 2018-03-26 VITALS
DIASTOLIC BLOOD PRESSURE: 76 MMHG | SYSTOLIC BLOOD PRESSURE: 112 MMHG | BODY MASS INDEX: 40.6 KG/M2 | HEART RATE: 68 BPM | WEIGHT: 222 LBS

## 2018-03-26 DIAGNOSIS — E03.9 ACQUIRED HYPOTHYROIDISM: ICD-10-CM

## 2018-03-26 DIAGNOSIS — Z23 NEED FOR PROPHYLACTIC VACCINATION AGAINST DIPHTHERIA-TETANUS-PERTUSSIS (DTP): ICD-10-CM

## 2018-03-26 DIAGNOSIS — M1A.9XX1 TOPHI: ICD-10-CM

## 2018-03-26 DIAGNOSIS — E03.9 HYPOTHYROIDISM (ACQUIRED): ICD-10-CM

## 2018-03-26 DIAGNOSIS — Z00.00 ROUTINE GENERAL MEDICAL EXAMINATION AT A HEALTH CARE FACILITY: Primary | ICD-10-CM

## 2018-03-26 DIAGNOSIS — I10 ESSENTIAL HYPERTENSION: ICD-10-CM

## 2018-03-26 DIAGNOSIS — E78.2 MIXED HYPERLIPIDEMIA: ICD-10-CM

## 2018-03-26 DIAGNOSIS — N39.41 URGE INCONTINENCE OF URINE: ICD-10-CM

## 2018-03-26 DIAGNOSIS — Z23 NEED FOR PROPHYLACTIC VACCINATION AND INOCULATION AGAINST VARICELLA: ICD-10-CM

## 2018-03-26 PROCEDURE — 0509F URINE INCON PLAN DOCD: CPT | Performed by: FAMILY MEDICINE

## 2018-03-26 PROCEDURE — G8427 DOCREV CUR MEDS BY ELIG CLIN: HCPCS | Performed by: FAMILY MEDICINE

## 2018-03-26 PROCEDURE — 99214 OFFICE O/P EST MOD 30 MIN: CPT | Performed by: FAMILY MEDICINE

## 2018-03-26 PROCEDURE — G8400 PT W/DXA NO RESULTS DOC: HCPCS | Performed by: FAMILY MEDICINE

## 2018-03-26 PROCEDURE — 1123F ACP DISCUSS/DSCN MKR DOCD: CPT | Performed by: FAMILY MEDICINE

## 2018-03-26 PROCEDURE — 1090F PRES/ABSN URINE INCON ASSESS: CPT | Performed by: FAMILY MEDICINE

## 2018-03-26 PROCEDURE — G8482 FLU IMMUNIZE ORDER/ADMIN: HCPCS | Performed by: FAMILY MEDICINE

## 2018-03-26 PROCEDURE — 1036F TOBACCO NON-USER: CPT | Performed by: FAMILY MEDICINE

## 2018-03-26 PROCEDURE — 4040F PNEUMOC VAC/ADMIN/RCVD: CPT | Performed by: FAMILY MEDICINE

## 2018-03-26 PROCEDURE — G8417 CALC BMI ABV UP PARAM F/U: HCPCS | Performed by: FAMILY MEDICINE

## 2018-03-26 PROCEDURE — G0439 PPPS, SUBSEQ VISIT: HCPCS | Performed by: FAMILY MEDICINE

## 2018-03-26 RX ORDER — ATORVASTATIN CALCIUM 10 MG/1
TABLET, FILM COATED ORAL
Qty: 90 TABLET | Refills: 3 | Status: SHIPPED | OUTPATIENT
Start: 2018-03-26 | End: 2019-03-09 | Stop reason: SDUPTHER

## 2018-03-26 RX ORDER — LEVOTHYROXINE SODIUM 0.1 MG/1
100 TABLET ORAL DAILY
Qty: 90 TABLET | Refills: 3 | Status: SHIPPED | OUTPATIENT
Start: 2018-03-26 | End: 2019-03-09 | Stop reason: SDUPTHER

## 2018-03-26 RX ORDER — ALLOPURINOL 100 MG/1
100 TABLET ORAL DAILY
Qty: 30 TABLET | Refills: 0 | Status: SHIPPED | OUTPATIENT
Start: 2018-03-26 | End: 2020-06-17 | Stop reason: DRUGHIGH

## 2018-03-26 RX ORDER — QUINAPRIL 10 MG/1
TABLET ORAL
Qty: 90 TABLET | Refills: 3 | Status: SHIPPED | OUTPATIENT
Start: 2018-03-26 | End: 2019-03-09 | Stop reason: SDUPTHER

## 2018-03-26 ASSESSMENT — ANXIETY QUESTIONNAIRES: GAD7 TOTAL SCORE: 0

## 2018-03-26 ASSESSMENT — LIFESTYLE VARIABLES
AUDIT-C TOTAL SCORE: 1
HOW MANY STANDARD DRINKS CONTAINING ALCOHOL DO YOU HAVE ON A TYPICAL DAY: 0
HOW OFTEN DO YOU HAVE SIX OR MORE DRINKS ON ONE OCCASION: 0
HOW OFTEN DO YOU HAVE A DRINK CONTAINING ALCOHOL: 1

## 2018-03-26 ASSESSMENT — ENCOUNTER SYMPTOMS
ORTHOPNEA: 0
BLURRED VISION: 0
SHORTNESS OF BREATH: 0

## 2018-07-13 RX ORDER — DULOXETIN HYDROCHLORIDE 60 MG/1
CAPSULE, DELAYED RELEASE ORAL
Qty: 30 CAPSULE | Refills: 5 | Status: SHIPPED | OUTPATIENT
Start: 2018-07-13 | End: 2019-01-08 | Stop reason: SDUPTHER

## 2018-11-09 DIAGNOSIS — M1A.9XX1 TOPHI: ICD-10-CM

## 2018-11-09 RX ORDER — COLCHICINE 0.6 MG/1
TABLET, FILM COATED ORAL
Qty: 30 TABLET | Refills: 5 | Status: SHIPPED | OUTPATIENT
Start: 2018-11-09 | End: 2019-05-15 | Stop reason: SDUPTHER

## 2019-01-09 RX ORDER — DULOXETIN HYDROCHLORIDE 60 MG/1
CAPSULE, DELAYED RELEASE ORAL
Qty: 30 CAPSULE | Refills: 5 | Status: SHIPPED | OUTPATIENT
Start: 2019-01-09 | End: 2019-07-08 | Stop reason: SDUPTHER

## 2019-03-09 DIAGNOSIS — I10 ESSENTIAL HYPERTENSION: ICD-10-CM

## 2019-03-09 DIAGNOSIS — E03.9 HYPOTHYROIDISM (ACQUIRED): ICD-10-CM

## 2019-03-09 RX ORDER — ATORVASTATIN CALCIUM 10 MG/1
TABLET, FILM COATED ORAL
Qty: 90 TABLET | Refills: 3 | Status: SHIPPED | OUTPATIENT
Start: 2019-03-09 | End: 2020-03-09

## 2019-03-09 RX ORDER — QUINAPRIL 10 MG/1
TABLET ORAL
Qty: 90 TABLET | Refills: 3 | Status: SHIPPED | OUTPATIENT
Start: 2019-03-09 | End: 2020-03-09

## 2019-03-09 RX ORDER — LEVOTHYROXINE SODIUM 0.1 MG/1
TABLET ORAL
Qty: 90 TABLET | Refills: 3 | Status: SHIPPED | OUTPATIENT
Start: 2019-03-09 | End: 2020-03-09

## 2019-04-01 ENCOUNTER — OFFICE VISIT (OUTPATIENT)
Dept: FAMILY MEDICINE CLINIC | Age: 84
End: 2019-04-01
Payer: MEDICARE

## 2019-04-01 VITALS
DIASTOLIC BLOOD PRESSURE: 82 MMHG | OXYGEN SATURATION: 97 % | SYSTOLIC BLOOD PRESSURE: 128 MMHG | WEIGHT: 220.25 LBS | HEART RATE: 90 BPM | BODY MASS INDEX: 40.28 KG/M2

## 2019-04-01 DIAGNOSIS — E03.9 ACQUIRED HYPOTHYROIDISM: ICD-10-CM

## 2019-04-01 DIAGNOSIS — E66.01 MORBID OBESITY WITH BMI OF 40.0-44.9, ADULT (HCC): ICD-10-CM

## 2019-04-01 DIAGNOSIS — I10 ESSENTIAL HYPERTENSION: ICD-10-CM

## 2019-04-01 DIAGNOSIS — E78.2 MIXED HYPERLIPIDEMIA: ICD-10-CM

## 2019-04-01 DIAGNOSIS — N39.46 MIXED STRESS AND URGE URINARY INCONTINENCE: ICD-10-CM

## 2019-04-01 DIAGNOSIS — N81.10 BLADDER PROLAPSE, FEMALE, ACQUIRED: ICD-10-CM

## 2019-04-01 DIAGNOSIS — Z00.00 ROUTINE GENERAL MEDICAL EXAMINATION AT A HEALTH CARE FACILITY: ICD-10-CM

## 2019-04-01 PROCEDURE — G8400 PT W/DXA NO RESULTS DOC: HCPCS | Performed by: FAMILY MEDICINE

## 2019-04-01 PROCEDURE — 99214 OFFICE O/P EST MOD 30 MIN: CPT | Performed by: FAMILY MEDICINE

## 2019-04-01 PROCEDURE — G8427 DOCREV CUR MEDS BY ELIG CLIN: HCPCS | Performed by: FAMILY MEDICINE

## 2019-04-01 PROCEDURE — G0439 PPPS, SUBSEQ VISIT: HCPCS | Performed by: FAMILY MEDICINE

## 2019-04-01 PROCEDURE — 1090F PRES/ABSN URINE INCON ASSESS: CPT | Performed by: FAMILY MEDICINE

## 2019-04-01 PROCEDURE — 4040F PNEUMOC VAC/ADMIN/RCVD: CPT | Performed by: FAMILY MEDICINE

## 2019-04-01 PROCEDURE — 1036F TOBACCO NON-USER: CPT | Performed by: FAMILY MEDICINE

## 2019-04-01 PROCEDURE — 1123F ACP DISCUSS/DSCN MKR DOCD: CPT | Performed by: FAMILY MEDICINE

## 2019-04-01 PROCEDURE — G8417 CALC BMI ABV UP PARAM F/U: HCPCS | Performed by: FAMILY MEDICINE

## 2019-04-01 PROCEDURE — 0509F URINE INCON PLAN DOCD: CPT | Performed by: FAMILY MEDICINE

## 2019-04-01 ASSESSMENT — PATIENT HEALTH QUESTIONNAIRE - PHQ9
SUM OF ALL RESPONSES TO PHQ QUESTIONS 1-9: 1
1. LITTLE INTEREST OR PLEASURE IN DOING THINGS: 1
SUM OF ALL RESPONSES TO PHQ QUESTIONS 1-9: 1
SUM OF ALL RESPONSES TO PHQ9 QUESTIONS 1 & 2: 1
2. FEELING DOWN, DEPRESSED OR HOPELESS: 0

## 2019-04-01 ASSESSMENT — LIFESTYLE VARIABLES
HOW OFTEN DO YOU HAVE A DRINK CONTAINING ALCOHOL: 1
AUDIT-C TOTAL SCORE: 1
HOW MANY STANDARD DRINKS CONTAINING ALCOHOL DO YOU HAVE ON A TYPICAL DAY: 0
HOW OFTEN DO YOU HAVE SIX OR MORE DRINKS ON ONE OCCASION: 0

## 2019-04-01 NOTE — PATIENT INSTRUCTIONS
n try an over the counter colace in addition to the miralax or an over the counter magnesium- nor more than 250mg or 500 mg on the magnesium  Personalized Preventive Plan for Peter Guerra - 4/1/2019  Medicare offers a range of preventive health benefits. Some of the tests and screenings are paid in full while other may be subject to a deductible, co-insurance, and/or copay. Some of these benefits include a comprehensive review of your medical history including lifestyle, illnesses that may run in your family, and various assessments and screenings as appropriate. After reviewing your medical record and screening and assessments performed today your provider may have ordered immunizations, labs, imaging, and/or referrals for you. A list of these orders (if applicable) as well as your Preventive Care list are included within your After Visit Summary for your review. Other Preventive Recommendations:    · A preventive eye exam performed by an eye specialist is recommended every 1-2 years to screen for glaucoma; cataracts, macular degeneration, and other eye disorders. · A preventive dental visit is recommended every 6 months. · Try to get at least 150 minutes of exercise per week or 10,000 steps per day on a pedometer . · Order or download the FREE \"Exercise & Physical Activity: Your Everyday Guide\" from [employer]. Call 2-149.576.3257 or search [employer] online. · You need 4207-6481 mg of calcium and 4430-4019 IU of vitamin D per day. It is possible to meet your calcium requirement with diet alone, but a vitamin D supplement is usually necessary to meet this goal.  · When exposed to the sun, use a sunscreen that protects against both UVA and UVB radiation with an SPF of 30 or greater. Reapply every 2 to 3 hours or after sweating, drying off with a towel, or swimming. · Always wear a seat belt when traveling in a car.

## 2019-04-01 NOTE — PROGRESS NOTES
Medicare Annual Wellness Visit  Name: Sree Gary Date: 2019   MRN: [id number] Sex: Female   Age: 80 y.o. Ethnicity: Non-/Non    : 1934 Race: Shae [place] is here for Medicare AWV    Screenings for behavioral, psychosocial and functional/safety risks, and cognitive dysfunction are all negative except as indicated below. These results, as well as other patient data from the [address] form, are documented in Flowsheets linked to this Encounter. Has no significant pain in her hands but has the ongoing deformity in her hands douglas the right hand. Did see the specialist    Has the ongoing bladder issue. Gets up 2-3 times per night. Not having any burning when she urinates, still has some leaking when she sneezes or coughs. Has the chronic constipation. Has been eating a lot of fruit for the constipation and the miralax, works but not all the time quite enough. Has pain in the left shoulder all the time, takes 2 tylenol twice daily and this does the job for this. Not interested in anything else for this at this time. Allergies   Allergen Reactions    Tramadol      dizziness    Vesicare [Solifenacin]      Swollen fingers   bl  Prior to Visit Medications    Medication Sig Taking?  Authorizing Provider   quinapril (ACCUPRIL) 10 MG tablet take 1 tablet by mouth once daily Yes Dionne Montelongo MD   levothyroxine (SYNTHROID) 100 MCG tablet take 1 tablet by mouth once daily Yes Dionne Montelongo MD   atorvastatin (LIPITOR) 10 MG tablet take 1 tablet by mouth at bedtime Yes Dionne Montelongo MD   DULoxetine (CYMBALTA) 60 MG extended release capsule take 1 capsule by mouth once daily Yes Dionne Montelongo MD   COLCRYS 0.6 MG tablet take 1 tablet by mouth once daily Yes Dionne Montelongo MD   calcium carbonate (OYSTER SHELL CALCIUM 500 MG) 1250 (500 Ca) MG tablet Take 1 tablet by mouth daily Yes Historical Provider, MD   Multiple Vitamins-Minerals SURGERY      bunions and hammertoes    HYSTERECTOMY, TOTAL ABDOMINAL  01/09/2018    KNEE ARTHROPLASTY Bilateral     LEEP  2002       Family History   Problem Relation Age of Onset    Other Other         polycythemia vera    Other Daughter         polycystic ovary       CareTeam (Including outside providers/suppliers regularly involved in providing care):   Patient Care Team:  Irma Carlton MD as PCP - General (Family Medicine)  Irma Carlton MD as PCP - S Attributed Provider    Wt Readings from Last 3 Encounters:   04/01/19 220 lb 4 oz (99.9 kg)   03/26/18 222 lb (100.7 kg)   11/11/17 231 lb (104.8 kg)     Vitals:    04/01/19 1335   BP: 128/82   Pulse: 90   SpO2: 97%   Weight: 220 lb 4 oz (99.9 kg)     Body mass index is 40.28 kg/m². Based upon direct observation of the patient, evaluation of cognition reveals recent and remote memory intact. Physical Exam   Constitutional: She is oriented to person, place, and time. She appears well-developed and well-nourished. HENT:   Head: Normocephalic and atraumatic. Eyes: Conjunctivae and EOM are normal.   Neck: Normal range of motion. Neck supple. No JVD present. No thyromegaly present. Cardiovascular: Normal rate, regular rhythm and intact distal pulses. Exam reveals no gallop and no friction rub. No murmur heard. Pulmonary/Chest: Effort normal and breath sounds normal. No respiratory distress. Abdominal: Soft. She exhibits no distension and no mass. There is no tenderness. There is no rebound and no guarding. Genitourinary: No bleeding in the vagina. Musculoskeletal: She exhibits no edema. 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 Gait is unremarkable at this time   Lymphadenopathy:     She has no cervical adenopathy. Neurological: She is alert and oriented to person, place, and time. She displays normal reflexes. No cranial nerve deficit or sensory deficit. She exhibits normal muscle tone.  Coordination normal.   Gait is normal   Skin: Skin is warm. Psychiatric: She has a normal mood and affect. Her behavior is normal. Judgment and thought content normal.   Nursing note and vitals reviewed. Patient's complete Health Risk Assessment and screening values have been reviewed and are found in Flowsheets. The following problems were reviewed today and where indicated follow up appointments were made and/or referrals ordered. Diagnosis Orders   1. Routine general medical examination at a health care facility     2. Acquired hypothyroidism  T4, Free    TSH without Reflex- asx at this time, due for repeat labs today, orrdered   3. Mixed hyperlipidemia  Lipid Panel, asx, continue with risk factor modification    AST    Basic Metabolic Panel    ALT   4. Essential hypertension  Basic Metabolic Panel- controlled no change in her medications today   5. Bladder prolapse, female, acquired  Has had surgical correction with continued symptoms, will continue to monitor, no change in her treatment at this time   6. Mixed stress and urge urinary incontinence         Positive Risk Factor Screenings with Interventions:     Health Habits/Nutrition:  Health Habits/Nutrition  Do you exercise for at least 20 minutes 2-3 times per week?: (!) No  Have you lost any weight without trying in the past 3 months?: No  Do you eat fewer than 2 meals per day?: No  Have you seen a dentist within the past year?: Yes  Body mass index is 40.28 kg/m².   Health Habits/Nutrition Interventions:  · Inadequate physical activity:  encouraged to increase her activity as able    Hearing/Vision:  Hearing/Vision  Do you or your family notice any trouble with your hearing?: No  Do you have difficulty driving, watching TV, or doing any of your daily activities because of your eyesight?: No  Have you had an eye exam within the past year?: (!) No  Hearing/Vision Interventions:  · Vision concerns:  patient encouraged to make appointment with his/her eye specialist    Safety:  Safety  Do you have working smoke detectors?: Yes  Have all throw rugs been removed or fastened?: (!) No  Do you have non-slip mats in all bathtubs?: Yes  Do all of your stairways have a railing or banister?: Yes  Are your doorways, halls and stairs free of clutter?: Yes  Do you always fasten your seatbelt when you are in a car?: Yes  Safety Interventions:  · Home safety tips provided    Personalized Preventive Plan   Current Health Maintenance Status  Immunization History   Administered Date(s) Administered    61-tdap (Boostrix, Adacel) 10/01/2018    Influenza, High Dose (Fluzone 65 yrs and older) 01/09/2017, 09/01/2017    Pneumococcal 13-valent Conjugate (Ofnfaco55) 06/11/2015    Pneumococcal Polysaccharide ([id number]) 05/23/2008    Zoster Live (Zostavax) 01/09/2011        Health Maintenance   Topic Date Due    DTaP/Tdap/Td vaccine (1 - Tdap) 07/28/1953    Shingles Vaccine (2 of 3) 03/06/2011    TSH testing  03/15/2019    Potassium monitoring  03/15/2019    Creatinine monitoring  03/15/2019    Flu vaccine (Season Ended) 09/01/2019    DEXA (modify frequency per FRAX score)  Completed    Pneumococcal 65+ years Vaccine  Completed     Recommendations for Preventive Services Due: see orders and patient instructions/AVS.  .   Recommended screening schedule for the next 5-10 years is provided to the patient in written form: see Patient Instructions/AVS.

## 2019-04-09 LAB
AGE FOR GFR: 84
ALT SERPL-CCNC: 24 UNITS/L (ref 9–52)
ANION GAP SERPL CALCULATED.3IONS-SCNC: 14 MMOL/L
AST SERPL-CCNC: 28 UNITS/L (ref 14–36)
BUN BLDV-MCNC: 20 MG/DL (ref 7–17)
CALCIUM SERPL-MCNC: 10.1 MG/DL (ref 8.4–10.2)
CHLORIDE BLD-SCNC: 102 MMOL/L (ref 98–120)
CHOLESTEROL/HDL RATIO: 2.9 RATIO (ref 0–4.5)
CHOLESTEROL: 150 MG/DL (ref 50–200)
CO2: 29 MMOL/L (ref 22–31)
CREAT SERPL-MCNC: 0.7 MG/DL (ref 0.5–1)
EGFR BF: 96 ML/MIN/1.73 M2
EGFR BM: 130 ML/MIN/1.73 M2
EGFR WF: 80 ML/MIN/1.73 M2
EGFR WM: 107 ML/MIN/1.73 M2
GLUCOSE: 92 MG/DL (ref 65–105)
HDL, DIRECT: 52 MG/DL (ref 36–68)
LDL CHOLESTEROL CALCULATED: 73.8 MG/DL (ref 0–160)
POTASSIUM SERPL-SCNC: 4.7 MMOL/L (ref 3.6–5)
SODIUM BLD-SCNC: 140 MMOL/L (ref 135–145)
T4 FREE: 1.18 NG/DL (ref 0.78–2.1)
TRIGL SERPL-MCNC: 121 MG/DL (ref 10–250)
TSH SERPL DL<=0.05 MIU/L-ACNC: 3.58 MIU/ML (ref 0.49–4.6)
VLDLC SERPL CALC-MCNC: 24 MG/DL (ref 0–40)

## 2019-05-15 DIAGNOSIS — M1A.9XX1 TOPHI: ICD-10-CM

## 2019-05-15 RX ORDER — COLCHICINE 0.6 MG/1
TABLET, FILM COATED ORAL
Qty: 30 TABLET | Refills: 5 | Status: SHIPPED | OUTPATIENT
Start: 2019-05-15 | End: 2019-11-15 | Stop reason: SDUPTHER

## 2019-07-08 RX ORDER — DULOXETIN HYDROCHLORIDE 60 MG/1
CAPSULE, DELAYED RELEASE ORAL
Qty: 90 CAPSULE | Refills: 1 | Status: SHIPPED | OUTPATIENT
Start: 2019-07-08 | End: 2020-01-08

## 2019-07-08 NOTE — TELEPHONE ENCOUNTER
Raiza Bonilla is calling to request a refill on the following medication(s):  Requested Prescriptions     Pending Prescriptions Disp Refills    DULoxetine (CYMBALTA) 60 MG extended release capsule [Pharmacy Med Name: DULOXETINE HCL DR 60 MG CAP] 90 capsule 1     Sig: take 1 capsule by mouth once daily       Last Visit Date (If Applicable):  6/1/7979    Next Visit Date:    10/2/2019

## 2019-09-26 ENCOUNTER — TELEPHONE (OUTPATIENT)
Dept: FAMILY MEDICINE CLINIC | Age: 84
End: 2019-09-26

## 2019-11-15 DIAGNOSIS — M1A.9XX1 TOPHI: ICD-10-CM

## 2019-11-16 RX ORDER — COLCHICINE 0.6 MG/1
TABLET, FILM COATED ORAL
Qty: 30 TABLET | Refills: 5 | Status: SHIPPED | OUTPATIENT
Start: 2019-11-16 | End: 2020-05-06 | Stop reason: SDUPTHER

## 2020-01-08 RX ORDER — DULOXETIN HYDROCHLORIDE 60 MG/1
CAPSULE, DELAYED RELEASE ORAL
Qty: 90 CAPSULE | Refills: 1 | Status: SHIPPED | OUTPATIENT
Start: 2020-01-08 | End: 2020-07-13

## 2020-01-22 RX ORDER — DULOXETIN HYDROCHLORIDE 60 MG/1
CAPSULE, DELAYED RELEASE ORAL
Qty: 90 CAPSULE | Refills: 1 | OUTPATIENT
Start: 2020-01-22

## 2020-03-09 RX ORDER — QUINAPRIL 10 MG/1
TABLET ORAL
Qty: 90 TABLET | Refills: 3 | Status: SHIPPED | OUTPATIENT
Start: 2020-03-09 | End: 2021-01-14 | Stop reason: SDUPTHER

## 2020-03-09 RX ORDER — LEVOTHYROXINE SODIUM 0.1 MG/1
TABLET ORAL
Qty: 90 TABLET | Refills: 3 | Status: SHIPPED | OUTPATIENT
Start: 2020-03-09 | End: 2021-01-14 | Stop reason: SDUPTHER

## 2020-03-09 RX ORDER — ATORVASTATIN CALCIUM 10 MG/1
TABLET, FILM COATED ORAL
Qty: 90 TABLET | Refills: 3 | Status: SHIPPED | OUTPATIENT
Start: 2020-03-09 | End: 2021-03-03 | Stop reason: SDUPTHER

## 2020-03-09 NOTE — TELEPHONE ENCOUNTER
Soila Sahni is requesting a refill on the following medication(s):  Requested Prescriptions     Pending Prescriptions Disp Refills    atorvastatin (LIPITOR) 10 MG tablet [Pharmacy Med Name: ATORVASTATIN 10 MG TABLET] 90 tablet 3     Sig: take 1 tablet by mouth at bedtime    quinapril (ACCUPRIL) 10 MG tablet [Pharmacy Med Name: QUINAPRIL 10 MG TABLET] 90 tablet 3     Sig: take 1 tablet by mouth once daily    levothyroxine (SYNTHROID) 100 MCG tablet [Pharmacy Med Name: LEVOTHYROXINE 100 MCG TABLET] 90 tablet 3     Sig: take 1 tablet by mouth once daily       Last Visit Date (If Applicable):  9/6/7760    Next Visit Date:    4/6/2020

## 2020-03-25 PROBLEM — E03.9 HYPOTHYROIDISM: Status: RESOLVED | Noted: 2017-08-29 | Resolved: 2020-03-24

## 2020-05-06 RX ORDER — COLCHICINE 0.6 MG/1
TABLET ORAL
Qty: 30 TABLET | Refills: 5 | Status: SHIPPED | OUTPATIENT
Start: 2020-05-06 | End: 2020-11-20

## 2020-05-06 NOTE — TELEPHONE ENCOUNTER
Verenice Pinedo is calling to request a refill on the following medication(s):  Requested Prescriptions     Pending Prescriptions Disp Refills    colchicine (COLCRYS) 0.6 MG tablet 30 tablet 5       Last Visit Date (If Applicable):  9/4/4810    Next Visit Date:    6/17/2020

## 2020-06-17 ENCOUNTER — OFFICE VISIT (OUTPATIENT)
Dept: FAMILY MEDICINE CLINIC | Age: 85
End: 2020-06-17
Payer: MEDICARE

## 2020-06-17 VITALS
SYSTOLIC BLOOD PRESSURE: 130 MMHG | HEART RATE: 115 BPM | BODY MASS INDEX: 39.14 KG/M2 | OXYGEN SATURATION: 98 % | DIASTOLIC BLOOD PRESSURE: 84 MMHG | WEIGHT: 214 LBS

## 2020-06-17 PROBLEM — E66.01 MORBID OBESITY WITH BMI OF 40.0-44.9, ADULT (HCC): Status: ACTIVE | Noted: 2020-06-17

## 2020-06-17 PROCEDURE — 1090F PRES/ABSN URINE INCON ASSESS: CPT | Performed by: FAMILY MEDICINE

## 2020-06-17 PROCEDURE — 1036F TOBACCO NON-USER: CPT | Performed by: FAMILY MEDICINE

## 2020-06-17 PROCEDURE — 1123F ACP DISCUSS/DSCN MKR DOCD: CPT | Performed by: FAMILY MEDICINE

## 2020-06-17 PROCEDURE — G0439 PPPS, SUBSEQ VISIT: HCPCS | Performed by: FAMILY MEDICINE

## 2020-06-17 PROCEDURE — 99212 OFFICE O/P EST SF 10 MIN: CPT | Performed by: FAMILY MEDICINE

## 2020-06-17 PROCEDURE — G8417 CALC BMI ABV UP PARAM F/U: HCPCS | Performed by: FAMILY MEDICINE

## 2020-06-17 PROCEDURE — 4040F PNEUMOC VAC/ADMIN/RCVD: CPT | Performed by: FAMILY MEDICINE

## 2020-06-17 PROCEDURE — 99214 OFFICE O/P EST MOD 30 MIN: CPT | Performed by: FAMILY MEDICINE

## 2020-06-17 PROCEDURE — G8427 DOCREV CUR MEDS BY ELIG CLIN: HCPCS | Performed by: FAMILY MEDICINE

## 2020-06-17 PROCEDURE — G8400 PT W/DXA NO RESULTS DOC: HCPCS | Performed by: FAMILY MEDICINE

## 2020-06-17 RX ORDER — ALLOPURINOL 300 MG/1
300 TABLET ORAL DAILY
Qty: 30 TABLET | Refills: 5 | Status: SHIPPED | OUTPATIENT
Start: 2020-06-17 | End: 2020-12-11

## 2020-06-17 RX ORDER — ASPIRIN 81 MG/1
81 TABLET ORAL DAILY
COMMUNITY
End: 2022-03-10 | Stop reason: ALTCHOICE

## 2020-06-17 ASSESSMENT — LIFESTYLE VARIABLES
HOW OFTEN DO YOU HAVE SIX OR MORE DRINKS ON ONE OCCASION: 0
AUDIT-C TOTAL SCORE: 1
HOW MANY STANDARD DRINKS CONTAINING ALCOHOL DO YOU HAVE ON A TYPICAL DAY: 0
HOW OFTEN DO YOU HAVE A DRINK CONTAINING ALCOHOL: 1

## 2020-06-17 ASSESSMENT — PATIENT HEALTH QUESTIONNAIRE - PHQ9
SUM OF ALL RESPONSES TO PHQ QUESTIONS 1-9: 0
SUM OF ALL RESPONSES TO PHQ QUESTIONS 1-9: 0

## 2020-06-17 NOTE — PROGRESS NOTES
Pulse: 115   SpO2: 98%   Weight: 214 lb (97.1 kg)     Body mass index is 39.14 kg/m². Based upon direct observation of the patient, evaluation of cognition reveals MMSE score 25/30. Physical Exam  Vitals signs and nursing note reviewed. Constitutional:       Appearance: Normal appearance. She is well-developed. HENT:      Head: Normocephalic and atraumatic. Mouth/Throat:      Mouth: Mucous membranes are moist.   Eyes:      Conjunctiva/sclera: Conjunctivae normal.   Neck:      Musculoskeletal: Normal range of motion and neck supple. Thyroid: No thyromegaly. Vascular: No JVD. Cardiovascular:      Rate and Rhythm: Normal rate and regular rhythm. Heart sounds: Normal heart sounds. No murmur. No friction rub. No gallop. Pulmonary:      Effort: Pulmonary effort is normal. No respiratory distress. Breath sounds: Normal breath sounds. Abdominal:      General: There is no distension. Palpations: Abdomen is soft. There is no mass. Tenderness: There is no abdominal tenderness. There is no guarding or rebound. Genitourinary:     Vagina: No bleeding. 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 Gait is unremarkable at this time. She also has new gouty tophi forming on the inner aspect of the left foot   Lymphadenopathy:      Cervical: No cervical adenopathy. Skin:     General: Skin is warm. Neurological:      General: No focal deficit present. Mental Status: She is alert and oriented to person, place, and time. Cranial Nerves: No cranial nerve deficit. Sensory: No sensory deficit. Motor: No abnormal muscle tone. Coordination: Coordination normal.      Deep Tendon Reflexes: Reflexes normal.      Comments: Gait is normal   Psychiatric:         Mood and Affect: Mood normal.         Behavior: Behavior normal.         Thought Content:  Thought content normal.         Judgment: Judgment normal.         Patient's complete time    Hearing/Vision:  No exam data present  Hearing/Vision  Do you or your family notice any trouble with your hearing?: (!) Yes  Do you have difficulty driving, watching TV, or doing any of your daily activities because of your eyesight?: No  Have you had an eye exam within the past year?: (!) No  Hearing/Vision Interventions:  · Hearing concerns:  patient declines any further evaluation/treatment for hearing issues    Personalized Preventive Plan   Current Health Maintenance Status  Immunization History   Administered Date(s) Administered    61-tdap (Boostrix, Adacel) 10/01/2018    Influenza Whole 10/16/2008, 09/17/2009, 10/21/2010    Influenza, High Dose (Fluzone 65 yrs and older) 12/11/2015, 01/09/2017, 09/01/2017, 11/13/2018, 12/16/2019    Pneumococcal Conjugate 13-valent ([id number]) 06/11/2015    Pneumococcal Polysaccharide (Jnuffnomf43) 05/23/2008    Tdap (Boostrix, Adacel) 11/13/2018    Zoster Live (Zostavax) 01/09/2011    Zoster Recombinant (Shingrix) 06/03/2019, 08/17/2019        Health Maintenance   Topic Date Due    Annual Wellness Visit (AWV)  05/29/2019    Lipid screen  04/09/2020    TSH testing  04/09/2020    Potassium monitoring  04/09/2020    Creatinine monitoring  04/09/2020    DTaP/Tdap/Td vaccine (2 - Td) 11/13/2028    DEXA (modify frequency per FRAX score)  Completed    Flu vaccine  Completed    Shingles Vaccine  Completed    Pneumococcal 65+ years Vaccine  Completed    Hepatitis A vaccine  Aged Out    Hepatitis B vaccine  Aged Out    Hib vaccine  Aged Out    Meningococcal (ACWY) vaccine  Aged Out     Recommendations for [employer] Due: see orders and patient instructions/AVS.  . Recommended screening schedule for the next 5-10 years is provided to the patient in written form: see Patient Instructions/AVS.    Anette DE LEON LPN, [birth year], performed the documented evaluation under the direct supervision of the attending physician.     Advance Care Planning Advance Care Planning (ACP) Physician/NP/PA (Provider) Conversation      Date of ACP Conversation: 6/17/2020    Conversation Conducted with:   Patient with Decision Making Codi Mario Maker:    Current Designated Health Care Decision Maker:      Note: Assess and validate information in current ACP documents, as indicated. If no Authorized Decision Maker has previously been identified, then patient chooses [address]:  \"Who would you like to name as your primary health care decision-maker? \"             Name: Jessica Balbuena        Relationship: Daughter         Phone number: See demographics  \"Can this person be reached easily? \" Yes   Note: If the relationship of these Decision-Makers to the patient does NOT follow your state's Next of Kin hierarchy, recommend that patient complete ACP document that meets state-specific requirements to allow them to act on the patient's behalf when appropriate. Care Preferences:    Hospitalization: \"If your health worsens and it becomes clear that your chance of recovery is unlikely, what would your preference be regarding hospitalization? \"  If needed to be more comfortable only    Ventilation: \"If you were in your present state of health and suddenly became very ill and were unable to breathe on your own, what would your preference be about the use of a ventilator (breathing machine) if it was available to you? \"    Yes at this time    \"If your health worsens and it becomes clear that your chance of recovery is unlikely, what would your preference be about the use of a ventilator (breathing machine) if it was available to you? \"   Probably not at that time    Resuscitation:  \"CPR works best to restart the heart when there is a sudden event, like a heart attack, in someone who is otherwise healthy. Unfortunately, CPR does not typically restart the heart for people who have serious health conditions or who are very sick. \"    \"In the event

## 2020-06-17 NOTE — PATIENT INSTRUCTIONS
other may be subject to a deductible, co-insurance, and/or copay. Some of these benefits include a comprehensive review of your medical history including lifestyle, illnesses that may run in your family, and various assessments and screenings as appropriate. After reviewing your medical record and screening and assessments performed today your provider may have ordered immunizations, labs, imaging, and/or referrals for you. A list of these orders (if applicable) as well as your Preventive Care list are included within your After Visit Summary for your review. Other Preventive Recommendations:    A preventive eye exam performed by an eye specialist is recommended every 1-2 years to screen for glaucoma; cataracts, macular degeneration, and other eye disorders. A preventive dental visit is recommended every 6 months. Try to get at least 150 minutes of exercise per week or 10,000 steps per day on a pedometer . Order or download the FREE \"Exercise & Physical Activity: Your Everyday Guide\" from [employer]. Call 0-517.816.3468 or search [employer] online. You need 6733-1884 mg of calcium and 4676-7322 IU of vitamin D per day. It is possible to meet your calcium requirement with diet alone, but a vitamin D supplement is usually necessary to meet this goal.  When exposed to the sun, use a sunscreen that protects against both UVA and UVB radiation with an SPF of 30 or greater. Reapply every 2 to 3 hours or after sweating, drying off with a towel, or swimming. Always wear a seat belt when traveling in a car.

## 2020-07-13 RX ORDER — DULOXETIN HYDROCHLORIDE 60 MG/1
CAPSULE, DELAYED RELEASE ORAL
Qty: 90 CAPSULE | Refills: 1 | Status: SHIPPED | OUTPATIENT
Start: 2020-07-13 | End: 2021-01-09

## 2020-11-20 RX ORDER — COLCHICINE 0.6 MG/1
TABLET ORAL
Qty: 30 TABLET | Refills: 5 | Status: SHIPPED | OUTPATIENT
Start: 2020-11-20 | End: 2021-05-11

## 2020-11-20 NOTE — TELEPHONE ENCOUNTER
Jsoeph Richardson is requesting a refill on the following medication(s):  Requested Prescriptions     Pending Prescriptions Disp Refills    colchicine (COLCRYS) 0.6 MG tablet [Pharmacy Med Name: COLCRYS 0.6 MG TABLET] 30 tablet 5     Sig: take 1 tablet by mouth once daily       Last Visit Date (If Applicable):  9/24/6288    Next Visit Date:    Visit date not found

## 2020-12-10 NOTE — TELEPHONE ENCOUNTER
[facility] is requesting a refill on the following medication(s):  Requested Prescriptions     Pending Prescriptions Disp Refills    allopurinol (ZYLOPRIM) 300 MG tablet [Pharmacy Med Name: ALLOPURINOL 300 MG TABLET] 30 tablet 5     Sig: take 1 tablet by mouth once daily       Last Visit Date (If Applicable):  8/54/3882    Next Visit Date:    Visit date not found

## 2020-12-11 RX ORDER — ALLOPURINOL 300 MG/1
TABLET ORAL
Qty: 30 TABLET | Refills: 5 | Status: SHIPPED | OUTPATIENT
Start: 2020-12-11 | End: 2021-01-14 | Stop reason: SDUPTHER

## 2021-01-14 ENCOUNTER — OFFICE VISIT (OUTPATIENT)
Dept: FAMILY MEDICINE CLINIC | Age: 86
End: 2021-01-14
Payer: MEDICARE

## 2021-01-14 VITALS
DIASTOLIC BLOOD PRESSURE: 80 MMHG | BODY MASS INDEX: 38.41 KG/M2 | SYSTOLIC BLOOD PRESSURE: 130 MMHG | WEIGHT: 210 LBS | HEART RATE: 133 BPM | OXYGEN SATURATION: 97 %

## 2021-01-14 DIAGNOSIS — E78.2 MIXED HYPERLIPIDEMIA: ICD-10-CM

## 2021-01-14 DIAGNOSIS — E03.9 HYPOTHYROIDISM (ACQUIRED): ICD-10-CM

## 2021-01-14 DIAGNOSIS — M1A.9XX1 TOPHI: ICD-10-CM

## 2021-01-14 DIAGNOSIS — I10 ESSENTIAL HYPERTENSION: Primary | ICD-10-CM

## 2021-01-14 DIAGNOSIS — E66.01 MORBID OBESITY WITH BMI OF 40.0-44.9, ADULT (HCC): ICD-10-CM

## 2021-01-14 DIAGNOSIS — H81.10 BENIGN PAROXYSMAL POSITIONAL VERTIGO, UNSPECIFIED LATERALITY: ICD-10-CM

## 2021-01-14 DIAGNOSIS — G45.9 TIA (TRANSIENT ISCHEMIC ATTACK): ICD-10-CM

## 2021-01-14 PROCEDURE — 1036F TOBACCO NON-USER: CPT | Performed by: FAMILY MEDICINE

## 2021-01-14 PROCEDURE — G8417 CALC BMI ABV UP PARAM F/U: HCPCS | Performed by: FAMILY MEDICINE

## 2021-01-14 PROCEDURE — 1090F PRES/ABSN URINE INCON ASSESS: CPT | Performed by: FAMILY MEDICINE

## 2021-01-14 PROCEDURE — 99214 OFFICE O/P EST MOD 30 MIN: CPT | Performed by: FAMILY MEDICINE

## 2021-01-14 PROCEDURE — 1123F ACP DISCUSS/DSCN MKR DOCD: CPT | Performed by: FAMILY MEDICINE

## 2021-01-14 PROCEDURE — G8484 FLU IMMUNIZE NO ADMIN: HCPCS | Performed by: FAMILY MEDICINE

## 2021-01-14 PROCEDURE — 4040F PNEUMOC VAC/ADMIN/RCVD: CPT | Performed by: FAMILY MEDICINE

## 2021-01-14 PROCEDURE — 99212 OFFICE O/P EST SF 10 MIN: CPT

## 2021-01-14 PROCEDURE — G8427 DOCREV CUR MEDS BY ELIG CLIN: HCPCS | Performed by: FAMILY MEDICINE

## 2021-01-14 RX ORDER — B-COMPLEX WITH VITAMIN C
1 TABLET ORAL 2 TIMES DAILY WITH MEALS
COMMUNITY

## 2021-01-14 RX ORDER — COLCHICINE 0.6 MG/1
TABLET ORAL
Qty: 30 TABLET | Refills: 5 | Status: CANCELLED | OUTPATIENT
Start: 2021-01-14

## 2021-01-14 RX ORDER — DULOXETIN HYDROCHLORIDE 60 MG/1
CAPSULE, DELAYED RELEASE ORAL
Qty: 90 CAPSULE | Refills: 3 | Status: SHIPPED | OUTPATIENT
Start: 2021-01-14 | End: 2022-03-31

## 2021-01-14 RX ORDER — AMOXICILLIN 500 MG/1
CAPSULE ORAL
COMMUNITY
Start: 2021-01-04 | End: 2022-03-10 | Stop reason: ALTCHOICE

## 2021-01-14 RX ORDER — LEVOTHYROXINE SODIUM 0.1 MG/1
TABLET ORAL
Qty: 90 TABLET | Refills: 3 | Status: SHIPPED | OUTPATIENT
Start: 2021-01-14 | End: 2022-03-01

## 2021-01-14 RX ORDER — QUINAPRIL 10 MG/1
TABLET ORAL
Qty: 90 TABLET | Refills: 3 | Status: SHIPPED | OUTPATIENT
Start: 2021-01-14 | End: 2022-03-10 | Stop reason: ALTCHOICE

## 2021-01-14 RX ORDER — ALLOPURINOL 300 MG/1
TABLET ORAL
Qty: 90 TABLET | Refills: 3 | Status: SHIPPED | OUTPATIENT
Start: 2021-01-14 | End: 2022-03-16

## 2021-01-14 RX ORDER — MECLIZINE HYDROCHLORIDE 25 MG/1
25 TABLET ORAL 3 TIMES DAILY PRN
Qty: 30 TABLET | Refills: 1 | Status: SHIPPED | OUTPATIENT
Start: 2021-01-14 | End: 2021-02-01 | Stop reason: SDUPTHER

## 2021-01-14 SDOH — ECONOMIC STABILITY: TRANSPORTATION INSECURITY
IN THE PAST 12 MONTHS, HAS LACK OF TRANSPORTATION KEPT YOU FROM MEETINGS, WORK, OR FROM GETTING THINGS NEEDED FOR DAILY LIVING?: NO

## 2021-01-14 SDOH — ECONOMIC STABILITY: FOOD INSECURITY: WITHIN THE PAST 12 MONTHS, THE FOOD YOU BOUGHT JUST DIDN'T LAST AND YOU DIDN'T HAVE MONEY TO GET MORE.: NEVER TRUE

## 2021-01-14 SDOH — ECONOMIC STABILITY: TRANSPORTATION INSECURITY
IN THE PAST 12 MONTHS, HAS THE LACK OF TRANSPORTATION KEPT YOU FROM MEDICAL APPOINTMENTS OR FROM GETTING MEDICATIONS?: NO

## 2021-01-14 ASSESSMENT — PATIENT HEALTH QUESTIONNAIRE - PHQ9
SUM OF ALL RESPONSES TO PHQ9 QUESTIONS 1 & 2: 0
SUM OF ALL RESPONSES TO PHQ QUESTIONS 1-9: 0
SUM OF ALL RESPONSES TO PHQ QUESTIONS 1-9: 0
1. LITTLE INTEREST OR PLEASURE IN DOING THINGS: 0
SUM OF ALL RESPONSES TO PHQ QUESTIONS 1-9: 0

## 2021-01-14 NOTE — PROGRESS NOTES
1200 [facility]  [address]  Dept: [phone]  Dept [facility]:[phone]    Rahel Alfred is a 80 y.o. female who presents today for her medical conditions/complaints as notedbelow. Rahel Alfred is c/o of Medication Refill ([employer] insurance wont cover it anymore and she states its not really helping either)      HPI:     Hypertension  This is a chronic problem. The current episode started more than 1 year ago. The problem is unchanged. The problem is controlled. Pertinent negatives include no anxiety, blurred vision, chest pain, headaches, malaise/fatigue, neck pain, orthopnea, palpitations, peripheral edema, PND, shortness of breath or sweats. There are no associated agents to hypertension. Risk factors for coronary artery disease include dyslipidemia, obesity, post-menopausal state and sedentary lifestyle. Past treatments include ACE inhibitors. The current treatment provides significant improvement. Compliance problems include exercise. Has been on the allopurinol and Colcrys and does not really think that there is been much improvement in her joints. She has not had any significant erythematous flares but does not really think they have gotten any better either. Is tolerating medications well. She has not any signs or symptoms of her hypothyroidism. No changes in her bowels energy level is okay    Has had some vertigo recenly a few days in the last week. When she lays down and turns over feels like it will spin a bit. Has had episodes like this in the past and did do a round of therapy once and this resolved for her. Would also like to try the meclizine as this is helped in the past also.      BP Readings from Last 3 Encounters:   01/14/21 130/80   06/17/20 130/84   04/01/19 128/82          (goal 120/80)    Wt Readings from Last 3 Encounters:   01/14/21 210 lb (95.3 kg)   06/17/20 214 lb (97.1 kg)   04/01/19 220 lb 4 oz 10 MG tablet take 1 tablet by mouth at bedtime 90 tablet 3    Omega-3 Fatty Acids (FISH OIL PO) Take 2 g by mouth      calcium carbonate (OYSTER SHELL CALCIUM 500 MG) 1250 (500 Ca) MG tablet Take 1 tablet by mouth daily      Multiple Vitamins-Minerals (CENTRUM SILVER PO) Take by mouth daily       No current facility-administered medications for this visit. Allergies   Allergen Reactions    Tramadol      dizziness    Vesicare [Solifenacin]      Swollen fingers       Health Maintenance   Topic Date Due    Lipid screen  04/09/2020    TSH testing  04/09/2020    Potassium monitoring  04/09/2020    Creatinine monitoring  04/09/2020    Annual Wellness Visit (AWV)  06/18/2021    DTaP/Tdap/Td vaccine (2 - Td) 11/13/2028    Flu vaccine  Completed    Shingles Vaccine  Completed    Pneumococcal 65+ years Vaccine  Completed    Hepatitis A vaccine  Aged Out    Hepatitis B vaccine  Aged Out    Hib vaccine  Aged Out    Meningococcal (ACWY) vaccine  Aged Out       Subjective:      Review of Systems   Constitutional: Negative for malaise/fatigue. Eyes: Negative for blurred vision. Respiratory: Negative for shortness of breath. Cardiovascular: Negative for chest pain, palpitations, orthopnea and PND. Musculoskeletal: Negative for neck pain. Neurological: Negative for headaches. Objective:     /80 (Site: Left Upper Arm, Position: Sitting, Cuff Size: Medium Adult)   Pulse 133   Wt 210 lb (95.3 kg)   SpO2 97%   BMI 38.41 kg/m²     Physical Exam  Vitals signs and nursing note reviewed. Constitutional:       Appearance: Normal appearance. She is well-developed. HENT:      Head: Normocephalic and atraumatic. Mouth/Throat:      Mouth: Mucous membranes are moist.   Eyes:      Extraocular Movements: Extraocular movements intact. Conjunctiva/sclera: Conjunctivae normal.      Pupils: Pupils are equal, round, and reactive to light.    Neck:      Musculoskeletal: Normal range of motion and neck supple. Thyroid: No thyromegaly. Vascular: No JVD. Cardiovascular:      Rate and Rhythm: Normal rate and regular rhythm. Heart sounds: Normal heart sounds. No murmur. No friction rub. No gallop. Pulmonary:      Effort: Pulmonary effort is normal. No respiratory distress. Breath sounds: Normal breath sounds. Genitourinary:     Vagina: No bleeding. Musculoskeletal:         General: Deformity present. Right lower leg: No edema. Left lower leg: No edema. 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 They are objectively smaller than at her last visit there is no erythema and there is no evidence of new formation at this time   Lymphadenopathy:      Cervical: No cervical adenopathy. Skin:     General: Skin is warm. Neurological:      General: No focal deficit present. Mental Status: She is alert and oriented to person, place, and time. Cranial Nerves: No cranial nerve deficit. Sensory: No sensory deficit. Motor: No abnormal muscle tone. Coordination: Coordination normal.      Deep Tendon Reflexes: Reflexes normal.   Psychiatric:         Mood and Affect: Mood normal.         Behavior: Behavior normal.         Thought Content: Thought content normal.         Judgment: Judgment normal.         Assessment/Plan:      Diagnosis Orders   1. Essential hypertension  quinapril (ACCUPRIL) 10 MG tablet   2. Tophi  allopurinol (ZYLOPRIM) 300 MG tablet    Uric Acid   3. Hypothyroidism (acquired)  levothyroxine (SYNTHROID) 100 MCG tablet   4. Morbid obesity with BMI of 40.0-44.9, adult ([facility] [address]     5. TIA (transient ischemic attack)     6. Mixed hyperlipidemia     7. Benign paroxysmal positional vertigo, unspecified laterality  meclizine (ANTIVERT) 25 MG tablet     Blood pressures well controlled at this time and asymptomatic. Hyperlipidemia likewise asymptomatic and controlled. No changes in her current medications.   For the tophi we are check the uric acid level and continue on the allopurinol at this time. If it is significant elevated would consider adding in an additional allopurinol daily. Use the Colcrys on an as-needed basis for acute flares. Hypothyroidism is asymptomatic and euthyroid at her last check. Check this regularly. Encourage diet and exercise for her weight to try to keep it down and try to decrease her risk related to her weight. Asymptomatic from her previous history of TIA and no recent neurologic symptoms. No changes in her risk factor modification. For the benign positional vertigo may use the meclizine but did discuss dizziness lightheadedness and possible increased fall risk with this. If this is not helpful would refer back to physical therapy for evaluation. Lab Results   Component Value Date    WBC 7.27 01/10/2018    HGB 11.5 (L) 01/10/2018    HCT 34.7 (L) 01/10/2018     01/10/2018    CHOL 150 04/09/2019    TRIG 121 04/09/2019    HDL 68 01/05/2017    ALT 24 04/09/2019    AST 28 04/09/2019     04/09/2019    K 4.7 04/09/2019     04/09/2019    CREATININE 0.7 04/09/2019    BUN 20 (H) 04/09/2019    CO2 29 04/09/2019    TSH 3.58 04/09/2019    INR 0.9 10/22/2017       Return in about 6 months (around 7/14/2021) for Medication recheck, AWV. Patient given educational materials - see patientinstructions. Discussed use, benefit, and side effects of prescribed medications. All patient questions answered. Pt voiced understanding. Reviewed health maintenance. Instructed to continue current medications, diet andexercise. Patient agreed with treatment plan. Follow up as directed.      (Please note that portions of this note were completed with a voice-recognition program. Efforts were made to edit the dictation but occasionally words are mis-transcribed.)    Electronically signed by Asim Woods MD on 1/17/2021

## 2021-01-17 ASSESSMENT — ENCOUNTER SYMPTOMS
BLURRED VISION: 0
ORTHOPNEA: 0
SHORTNESS OF BREATH: 0

## 2021-01-25 ENCOUNTER — HOSPITAL ENCOUNTER (OUTPATIENT)
Age: 86
Setting detail: SPECIMEN
Discharge: HOME | End: 2021-01-25
Payer: MEDICARE

## 2021-01-25 DIAGNOSIS — E78.2 MIXED HYPERLIPIDEMIA: ICD-10-CM

## 2021-01-25 DIAGNOSIS — M1A.9XX1 TOPHI: ICD-10-CM

## 2021-01-25 DIAGNOSIS — E03.9 HYPOTHYROIDISM (ACQUIRED): ICD-10-CM

## 2021-01-25 DIAGNOSIS — I10 ESSENTIAL HYPERTENSION: ICD-10-CM

## 2021-01-25 LAB
ALBUMIN SERPL-MCNC: 3.9 G/DL (ref 3.5–5.2)
ALBUMIN/GLOBULIN RATIO: 1 (ref 1–2.5)
ALP BLD-CCNC: 95 U/L (ref 35–104)
ALT SERPL-CCNC: 11 U/L (ref 5–33)
ANION GAP SERPL CALCULATED.3IONS-SCNC: 11 MMOL/L (ref 9–17)
AST SERPL-CCNC: 21 U/L
BILIRUB SERPL-MCNC: 0.36 MG/DL (ref 0.3–1.2)
BUN BLDV-MCNC: 17 MG/DL (ref 8–23)
BUN/CREAT BLD: 29 (ref 9–20)
CALCIUM SERPL-MCNC: 10 MG/DL (ref 8.6–10.4)
CHLORIDE BLD-SCNC: 103 MMOL/L (ref 98–107)
CHOLESTEROL/HDL RATIO: 2.2
CHOLESTEROL: 139 MG/DL
CO2: 25 MMOL/L (ref 20–31)
CREAT SERPL-MCNC: 0.58 MG/DL (ref 0.5–0.9)
GFR AFRICAN AMERICAN: >60 ML/MIN
GFR NON-AFRICAN AMERICAN: >60 ML/MIN
GFR SERPL CREATININE-BSD FRML MDRD: ABNORMAL ML/MIN/{1.73_M2}
GFR SERPL CREATININE-BSD FRML MDRD: ABNORMAL ML/MIN/{1.73_M2}
GLUCOSE BLD-MCNC: 100 MG/DL (ref 70–99)
HDLC SERPL-MCNC: 63 MG/DL
LDL CHOLESTEROL: 55 MG/DL (ref 0–130)
POTASSIUM SERPL-SCNC: 4.3 MMOL/L (ref 3.7–5.3)
SODIUM BLD-SCNC: 139 MMOL/L (ref 135–144)
THYROXINE, FREE: 1.41 NG/DL (ref 0.93–1.7)
TOTAL PROTEIN: 7.8 G/DL (ref 6.4–8.3)
TRIGL SERPL-MCNC: 105 MG/DL
TSH SERPL DL<=0.05 MIU/L-ACNC: 2.33 MIU/L (ref 0.3–5)
URIC ACID: 3.5 MG/DL (ref 2.4–5.7)
VLDLC SERPL CALC-MCNC: NORMAL MG/DL (ref 1–30)

## 2021-01-25 PROCEDURE — 84439 ASSAY OF FREE THYROXINE: CPT

## 2021-01-25 PROCEDURE — 84550 ASSAY OF BLOOD/URIC ACID: CPT

## 2021-01-25 PROCEDURE — 84443 ASSAY THYROID STIM HORMONE: CPT

## 2021-01-25 PROCEDURE — 80061 LIPID PANEL: CPT

## 2021-01-25 PROCEDURE — 80053 COMPREHEN METABOLIC PANEL: CPT

## 2021-01-25 PROCEDURE — 36415 COLL VENOUS BLD VENIPUNCTURE: CPT

## 2021-02-01 DIAGNOSIS — H81.10 BENIGN PAROXYSMAL POSITIONAL VERTIGO, UNSPECIFIED LATERALITY: ICD-10-CM

## 2021-02-01 RX ORDER — MECLIZINE HYDROCHLORIDE 25 MG/1
25 TABLET ORAL 3 TIMES DAILY PRN
Qty: 30 TABLET | Refills: 1 | Status: SHIPPED | OUTPATIENT
Start: 2021-02-01 | End: 2021-02-27 | Stop reason: SDUPTHER

## 2021-02-01 NOTE — TELEPHONE ENCOUNTER
Alfredo Jernigan is requesting a refill on the following medication(s):  Requested Prescriptions     Pending Prescriptions Disp Refills    meclizine (ANTIVERT) 25 MG tablet 30 tablet 1     Sig: Take 1 tablet by mouth 3 times daily as needed for Dizziness       Last Visit Date (If Applicable):  4/61/5808    Next Visit Date:    6/21/2021

## 2021-02-26 DIAGNOSIS — H81.10 BENIGN PAROXYSMAL POSITIONAL VERTIGO, UNSPECIFIED LATERALITY: ICD-10-CM

## 2021-02-26 NOTE — TELEPHONE ENCOUNTER
Sarah Briscoe is requesting a refill on the following medication(s):  Requested Prescriptions     Pending Prescriptions Disp Refills    meclizine (ANTIVERT) 25 MG tablet 30 tablet 1     Sig: Take 1 tablet by mouth 3 times daily as needed for Dizziness       Last Visit Date (If Applicable):  0/20/4236    Next Visit Date:    6/21/2021

## 2021-02-27 RX ORDER — MECLIZINE HYDROCHLORIDE 25 MG/1
25 TABLET ORAL 3 TIMES DAILY PRN
Qty: 30 TABLET | Refills: 1 | Status: SHIPPED | OUTPATIENT
Start: 2021-02-27 | End: 2021-03-17 | Stop reason: SDUPTHER

## 2021-03-03 NOTE — TELEPHONE ENCOUNTER
Parker Nadira is requesting a refill on the following medication(s):  Requested Prescriptions     Pending Prescriptions Disp Refills    atorvastatin (LIPITOR) 10 MG tablet 90 tablet 3     Sig: Take 1 tablet by mouth daily       Last Visit Date (If Applicable):  4/87/8312    Next Visit Date:    6/21/2021

## 2021-03-05 RX ORDER — ATORVASTATIN CALCIUM 10 MG/1
10 TABLET, FILM COATED ORAL DAILY
Qty: 90 TABLET | Refills: 3 | Status: SHIPPED | OUTPATIENT
Start: 2021-03-05 | End: 2022-03-01

## 2021-03-17 DIAGNOSIS — H81.10 BENIGN PAROXYSMAL POSITIONAL VERTIGO, UNSPECIFIED LATERALITY: ICD-10-CM

## 2021-03-17 RX ORDER — MECLIZINE HYDROCHLORIDE 25 MG/1
25 TABLET ORAL 3 TIMES DAILY PRN
Qty: 30 TABLET | Refills: 1 | Status: SHIPPED | OUTPATIENT
Start: 2021-03-17 | End: 2021-04-09 | Stop reason: SDUPTHER

## 2021-03-17 NOTE — TELEPHONE ENCOUNTER
Jeanice Epley is requesting a refill on the following medication(s):  Requested Prescriptions     Pending Prescriptions Disp Refills    meclizine (ANTIVERT) 25 MG tablet 30 tablet 1     Sig: Take 1 tablet by mouth 3 times daily as needed for Dizziness       Last Visit Date (If Applicable):  1/50/2975    Next Visit Date:    6/21/2021

## 2021-04-08 DIAGNOSIS — H81.10 BENIGN PAROXYSMAL POSITIONAL VERTIGO, UNSPECIFIED LATERALITY: ICD-10-CM

## 2021-04-08 NOTE — TELEPHONE ENCOUNTER
Sydney Odonnell is requesting a refill on the following medication(s):  Requested Prescriptions     Pending Prescriptions Disp Refills    meclizine (ANTIVERT) 25 MG tablet 30 tablet 1     Sig: Take 1 tablet by mouth 3 times daily as needed for Dizziness       Last Visit Date (If Applicable):  1/30/8722    Next Visit Date:    6/21/2021

## 2021-04-09 RX ORDER — MECLIZINE HYDROCHLORIDE 25 MG/1
25 TABLET ORAL 3 TIMES DAILY PRN
Qty: 30 TABLET | Refills: 1 | Status: SHIPPED | OUTPATIENT
Start: 2021-04-09 | End: 2021-08-04

## 2021-04-14 ENCOUNTER — OFFICE VISIT (OUTPATIENT)
Dept: FAMILY MEDICINE CLINIC | Age: 86
End: 2021-04-14
Payer: MEDICARE

## 2021-04-14 VITALS
HEART RATE: 65 BPM | SYSTOLIC BLOOD PRESSURE: 125 MMHG | WEIGHT: 210 LBS | OXYGEN SATURATION: 99 % | BODY MASS INDEX: 38.41 KG/M2 | DIASTOLIC BLOOD PRESSURE: 80 MMHG

## 2021-04-14 DIAGNOSIS — K62.5 RECTAL BLEEDING: Primary | ICD-10-CM

## 2021-04-14 DIAGNOSIS — R06.02 SOB (SHORTNESS OF BREATH): ICD-10-CM

## 2021-04-14 DIAGNOSIS — R53.1 WEAKNESS: ICD-10-CM

## 2021-04-14 LAB
ALBUMIN/GLOBULIN RATIO: 1.02 G/DL
ALBUMIN: 4.2 G/DL (ref 3.5–5)
ALP BLD-CCNC: 114 UNITS/L (ref 38–126)
ALT SERPL-CCNC: 19 UNITS/L (ref 4–35)
ANION GAP SERPL CALCULATED.3IONS-SCNC: 13.8 MMOL/L
AST SERPL-CCNC: 35 UNITS/L (ref 14–36)
BACTERIA, URINE: ABNORMAL
BASOPHILS %: 1.14 (ref 0–3)
BASOPHILS ABSOLUTE: 0.07 (ref 0–0.3)
BILIRUB SERPL-MCNC: 0.4 MG/DL (ref 0.2–1.3)
BILIRUBIN URINE: NEGATIVE
BLOOD, URINE: ABNORMAL
BUN BLDV-MCNC: 23 MG/DL (ref 7–17)
C-REACTIVE PROTEIN: 0.6 MG/DL (ref 0–1)
CALCIUM SERPL-MCNC: 10 MG/DL (ref 8.4–10.2)
CASTS UA: ABNORMAL
CHLORIDE BLD-SCNC: 104 MMOL/L (ref 98–120)
CLARITY: ABNORMAL
CO2: 22 MMOL/L (ref 22–31)
COLOR, URINE: YELLOW
CREAT SERPL-MCNC: 0.8 MG/DL (ref 0.5–1)
CRYSTALS, UA: ABNORMAL
EOSINOPHILS %: 1.05 (ref 0–10)
EOSINOPHILS ABSOLUTE: 0.07 (ref 0–1.1)
GFR CALCULATED: > 60
GLOBULIN: 4.1 G/DL
GLUCOSE URINE: NEGATIVE MG/DL
GLUCOSE: 124 MG/DL (ref 65–105)
HCT VFR BLD CALC: 45 % (ref 37–47)
HCT VFR BLD CALC: 48.1 % (ref 37–47)
HEMOGLOBIN: 14.3 (ref 12–16)
HEMOGLOBIN: 15.3 (ref 12–16)
HGB, POC: 14.1
KETONES, URINE: NEGATIVE MG/DL
LEUKOCYTE ESTERASE, URINE: ABNORMAL
LYMPHOCYTE %: 28.49 (ref 20–51.1)
LYMPHOCYTES ABSOLUTE: 1.82 (ref 1–5.5)
MCH RBC QN AUTO: 29.1 PG (ref 28.5–32.5)
MCHC RBC AUTO-ENTMCNC: 31.8 G/DL (ref 32–37)
MCV RBC AUTO: 91.3 FL (ref 80–94)
MONOCYTES %: 10.68 (ref 1.7–9.3)
MONOCYTES ABSOLUTE: 0.68 (ref 0.1–1)
MUCUS, URINE: ABNORMAL
NEUTROPHILS %: 58.65 (ref 42.2–75.2)
NEUTROPHILS ABSOLUTE: 3.74 (ref 2–8.1)
NITRITE, URINE: POSITIVE
PDW BLD-RTO: 13.3 % (ref 8.5–15.5)
PH UA: 6 (ref 5–8.5)
PLATELET # BLD: 357.4 THOU/MM3 (ref 130–400)
POTASSIUM SERPL-SCNC: 4.4 MMOL/L (ref 3.6–5)
PROTEIN UA: ABNORMAL MG/DL
RBC URINE: ABNORMAL (ref 0–2)
RBC: 5.26 M/UL (ref 4.2–5.4)
SARS-COV-2, RAPID: NEGATIVE
SEDIMENTATION RATE, ERYTHROCYTE: 22 MM/HR (ref 0–30)
SODIUM BLD-SCNC: 140 MMOL/L (ref 135–145)
SPECIFIC GRAVITY, URINE: 1.01 MG/DL (ref 1–1.03)
SQUAMOUS EPITHELIAL: ABNORMAL
TOTAL PROTEIN, SERUM: 8.3 G/DL (ref 6.3–8.2)
TRICHOMONAS, URINE: ABNORMAL
UROBILINOGEN, URINE: 0.2 MG/DL (ref 0.2–1)
WBC URINE: ABNORMAL (ref 0–4)
WBC: 6.4 THOU/ML3 (ref 4.8–10.8)
YEAST, URINE: ABNORMAL

## 2021-04-14 PROCEDURE — 85018 HEMOGLOBIN: CPT | Performed by: FAMILY MEDICINE

## 2021-04-14 PROCEDURE — 99213 OFFICE O/P EST LOW 20 MIN: CPT | Performed by: FAMILY MEDICINE

## 2021-04-14 ASSESSMENT — PATIENT HEALTH QUESTIONNAIRE - PHQ9
SUM OF ALL RESPONSES TO PHQ9 QUESTIONS 1 & 2: 1
SUM OF ALL RESPONSES TO PHQ QUESTIONS 1-9: 1
2. FEELING DOWN, DEPRESSED OR HOPELESS: 0
SUM OF ALL RESPONSES TO PHQ QUESTIONS 1-9: 1

## 2021-04-14 NOTE — PROGRESS NOTES
1200 [facility]  [address]  Dept: [phone]  Dept [facility]:[phone]    Audrey Anderson is a 80 y.o. female who presents today for her medical conditions/complaints as notedbelow. Audrey Anderson is c/o of Rectal Bleeding (this morning, woke up this morning and had cramps in lower abd, 10:30am was the last time it was bloody, went again before she came and it was just a little bit) and Cystitis (might have a bladder infection, have to go a lot and having leakage )    ADMIT H and P      HPI:     HPI    Has been having some trouble with her bowels having to push for years. Takes something for her bowels. Has had bleeding with her hemorrhoids with the pushing off and on. This had been just a bit of blood   Had an episode a few days ago  Had some cramping across her lower stomach and it was all blood-- mucousy. -- loks like jello or pudding. Woke up with the cramping. Since this morning has had 5-6 episodes. Dark, cant tell if there is stool in with the blood. Has not eaten anything since breakfast so feels hungry and woozy. No fevers or chills. No feeling like nausea. Last colonoscopy was about 2008 this was normal.     Feels like has to urinate frequently no burning with urination. Slow coming out. This has been along time. Admits to feeling a bit dizzy when she sits up and when she stands up now. Is feeling diaphoretic and a little bit short of breath with exertion. She is feeling weak as well.     BP Readings from Last 3 Encounters:   04/14/21 125/80   01/14/21 130/80   06/17/20 130/84          (goal 120/80)    Wt Readings from Last 3 Encounters:   04/14/21 210 lb (95.3 kg)   01/14/21 210 lb (95.3 kg)   06/17/20 214 lb (97.1 kg)        Past Medical History:   Diagnosis Date    Bladder prolapse, female, acquired     with uterine prolapse and urinary retention    Goiter 2004    CT    HTN (hypertension)     Hyperlipidemia     Hypothyroidism (acquired)     TIA (transient ischemic attack)       Past Surgical History:   Procedure Laterality Date    BREAST BIOPSY Right 10/2008    benign    CATARACT REMOVAL Left 07/2010    COLONOSCOPY  2007    normal    DILATION AND CURETTAGE OF UTERUS  10/25/2017    FOOT SURGERY      bunions and hammertoes    HYSTERECTOMY, TOTAL ABDOMINAL  01/09/2018    KNEE ARTHROPLASTY Bilateral     LEEP  2002       Family History   Problem Relation Age of Onset    Other Other         polycythemia vera    Other Daughter         polycystic ovary       Social History     Tobacco Use    Smoking status: Never Smoker    Smokeless tobacco: Never Used   Substance Use Topics    Alcohol use: Yes     Comment: socially      Current Outpatient Medications   Medication Sig Dispense Refill    meclizine (ANTIVERT) 25 MG tablet Take 1 tablet by mouth 3 times daily as needed for Dizziness 30 tablet 1    atorvastatin (LIPITOR) 10 MG tablet Take 1 tablet by mouth daily 90 tablet 3    amoxicillin (AMOXIL) 500 MG capsule take 4 capsules by mouth 1 hour prior to dental appointment      Calcium Carbonate-Vitamin D (OYSTER SHELL CALCIUM/D) 500-200 MG-UNIT TABS Take 1 tablet by mouth 2 times daily (with meals)      allopurinol (ZYLOPRIM) 300 MG tablet take 1 tablet by mouth once daily 90 tablet 3    DULoxetine (CYMBALTA) 60 MG extended release capsule take 1 capsule by mouth once daily 90 capsule 3    levothyroxine (SYNTHROID) 100 MCG tablet take 1 tablet by mouth once daily 90 tablet 3    quinapril (ACCUPRIL) 10 MG tablet Take one tablet by mouth daily 90 tablet 3    colchicine (COLCRYS) 0.6 MG tablet take 1 tablet by mouth once daily 30 tablet 5    aspirin 81 MG EC tablet Take 81 mg by mouth daily      mirabegron (MYRBETRIQ) 25 MG TB24 Take 25 mg by mouth daily      Omega-3 Fatty Acids (FISH OIL PO) Take 2 g by mouth      calcium carbonate (OYSTER SHELL CALCIUM 500 MG) 1250 (500 Ca) MG tablet Take 1 tablet by mouth daily      Multiple Vitamins-Minerals (CENTRUM SILVER PO) Take by mouth daily       No current facility-administered medications for this visit. Allergies   Allergen Reactions    Tramadol      dizziness    Vesicare [Solifenacin]      Swollen fingers       Health Maintenance   Topic Date Due    COVID-19 Vaccine (1) Never done   [employer] Visit (AWV)  06/18/2021    Lipid screen  01/25/2022    TSH testing  01/25/2022    Potassium monitoring  01/25/2022    Creatinine monitoring  01/25/2022    DTaP/Tdap/Td vaccine (2 - Td) 11/13/2028    Flu vaccine  Completed    Shingles Vaccine  Completed    Pneumococcal 65+ years Vaccine  Completed    Hepatitis A vaccine  Aged Out    Hepatitis B vaccine  Aged Out    Hib vaccine  Aged Out    Meningococcal (ACWY) vaccine  Aged Out       Subjective:      Review of Systems    Objective:     /80 (Site: Left Upper Arm, Position: Sitting, Cuff Size: Large Adult)   Pulse 65   Wt 210 lb (95.3 kg)   SpO2 99%   BMI 38.41 kg/m²     Physical Exam  Vitals signs and nursing note reviewed. Constitutional:       Appearance: Normal appearance. She is well-developed. She is ill-appearing (Pale but not toxic diaphoretic). HENT:      Head: Normocephalic and atraumatic. Mouth/Throat:      Mouth: Mucous membranes are moist.   Eyes:      Extraocular Movements: Extraocular movements intact. Conjunctiva/sclera: Conjunctivae normal.      Pupils: Pupils are equal, round, and reactive to light. Neck:      Musculoskeletal: Normal range of motion and neck supple. Thyroid: No thyromegaly. Vascular: No JVD. Cardiovascular:      Rate and Rhythm: Normal rate and regular rhythm. Heart sounds: Normal heart sounds. No murmur. No friction rub. No gallop. Pulmonary:      Effort: Pulmonary effort is normal. No respiratory distress. Breath sounds: Normal breath sounds.    Abdominal:      General: Bowel sounds are normal.      Palpations: Abdomen is soft. There is no mass. Tenderness: There is no abdominal tenderness. There is no right CVA tenderness, left CVA tenderness or guarding. Genitourinary:     Vagina: No bleeding. Musculoskeletal:         General: Deformity present. Right lower leg: No edema. Left lower leg: No edema. 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 Lymphadenopathy:      Cervical: No cervical adenopathy. Skin:     General: Skin is warm. Neurological:      General: No focal deficit present. Mental Status: She is alert and oriented to person, place, and time. Cranial Nerves: No cranial nerve deficit. Sensory: No sensory deficit. Motor: No abnormal muscle tone. Coordination: Coordination normal.      Deep Tendon Reflexes: Reflexes normal.   Psychiatric:         Mood and Affect: Mood normal.         Behavior: Behavior normal.         Thought Content: Thought content normal.         Judgment: Judgment normal.         Assessment/Plan:     1. Rectal bleeding  -     POCT hemoglobin  2. SOB (shortness of breath)  3. Weakness    Hemoglobin is stable but with her acute onset her symptoms of diaphoresis lightheadedness with standing and weakness I would recommend inpatient evaluation urgently. I suspect that the hemoglobin being 14 is not an accurate overall reflection with hemoconcentration and she does need serial H&H's. Will admit for CT abdomen pelvis IV fluids and    Lab Results   Component Value Date    WBC 7.27 01/10/2018    HGB 14.1 04/14/2021    HCT 34.7 (L) 01/10/2018     01/10/2018    CHOL 139 01/25/2021    TRIG 105 01/25/2021    HDL 63 01/25/2021    ALT 11 01/25/2021    AST 21 01/25/2021     01/25/2021    K 4.3 01/25/2021     01/25/2021    CREATININE 0.58 01/25/2021    BUN 17 01/25/2021    CO2 25 01/25/2021    TSH 2.33 01/25/2021    INR 0.9 10/22/2017       Return After hospital stay. Patient given educational materials - see patientinstructions. Discussed use, benefit, and side effects of prescribed medications. All patient questions answered. Pt voiced understanding. Reviewed health maintenance. Instructed to continue current medications, diet andexercise. Patient agreed with treatment plan. Follow up as directed.      (Please note that portions of this note were completed with a voice-recognition program. Efforts were made to edit the dictation but occasionally words are mis-transcribed.)    Electronically signed by Deisy Douglas MD on 4/14/2021

## 2021-04-15 LAB
HCT VFR BLD CALC: 36.6 % (ref 37–47)
HCT VFR BLD CALC: 39.9 % (ref 37–47)
HEMOGLOBIN: 11.4 (ref 12–16)
HEMOGLOBIN: 12.2 (ref 12–16)

## 2021-04-16 LAB
HCT VFR BLD CALC: 36.8 % (ref 37–47)
HEMOGLOBIN: 11.9 (ref 12–16)

## 2021-04-17 LAB
ANION GAP SERPL CALCULATED.3IONS-SCNC: 13.7 MMOL/L
BUN BLDV-MCNC: 16 MG/DL (ref 7–17)
CALCIUM SERPL-MCNC: 8.6 MG/DL (ref 8.4–10.2)
CHLORIDE BLD-SCNC: 109 MMOL/L (ref 98–120)
CO2: 23 MMOL/L (ref 22–31)
CREAT SERPL-MCNC: 0.7 MG/DL (ref 0.5–1)
CREATININE CLEARANCE: 29.01
GFR CALCULATED: > 60
GLUCOSE: 82 MG/DL (ref 65–105)
POTASSIUM SERPL-SCNC: 3.7 MMOL/L (ref 3.6–5)
SODIUM BLD-SCNC: 142 MMOL/L (ref 135–145)

## 2021-04-21 ENCOUNTER — TELEPHONE (OUTPATIENT)
Dept: FAMILY MEDICINE CLINIC | Age: 86
End: 2021-04-21

## 2021-04-21 NOTE — TELEPHONE ENCOUNTER
[facility] Transitions Initial Follow Up Call    Outreach made within 2 business days of discharge: Yes    Patient: Dayanna Randhawa Patient : 1934   MRN: [id number]  Reason for Admission: GI bleed  Discharge Date:  21       Spoke with: patient    Discharge department/facility: [facility]    TCM Interactive Patient Contact:  Was patient able to fill all prescriptions: Yes  Was patient instructed to bring all medications to the follow-up visit: Yes  Is patient taking all medications as directed in the discharge summary?  Yes  Does patient understand their discharge instructions: Yes  Does patient have questions or concerns that need addressed prior to 7-14 day follow up office visit: no    Scheduled appointment with PCP within 7-14 days    Follow Up  Future Appointments   Date Time Provider Ashley Hernandez   2021  2:00 PM Leandro Mason MD [facility]   2021  1:15 PM Leandro Mason MD [facility]       Lyle Alicea LPN

## 2021-04-29 ENCOUNTER — OFFICE VISIT (OUTPATIENT)
Dept: FAMILY MEDICINE CLINIC | Age: 86
End: 2021-04-29
Payer: MEDICARE

## 2021-04-29 VITALS
HEART RATE: 75 BPM | OXYGEN SATURATION: 96 % | TEMPERATURE: 97.5 F | HEIGHT: 62 IN | SYSTOLIC BLOOD PRESSURE: 114 MMHG | BODY MASS INDEX: 38.83 KG/M2 | DIASTOLIC BLOOD PRESSURE: 76 MMHG | WEIGHT: 211 LBS

## 2021-04-29 DIAGNOSIS — I10 ESSENTIAL HYPERTENSION: ICD-10-CM

## 2021-04-29 DIAGNOSIS — R19.7 DIARRHEA OF PRESUMED INFECTIOUS ORIGIN: ICD-10-CM

## 2021-04-29 DIAGNOSIS — K62.5 RECTAL BLEEDING: Primary | ICD-10-CM

## 2021-04-29 DIAGNOSIS — H81.10 BENIGN PAROXYSMAL POSITIONAL VERTIGO, UNSPECIFIED LATERALITY: ICD-10-CM

## 2021-04-29 PROCEDURE — 1111F DSCHRG MED/CURRENT MED MERGE: CPT | Performed by: FAMILY MEDICINE

## 2021-04-29 PROCEDURE — 99495 TRANSJ CARE MGMT MOD F2F 14D: CPT | Performed by: FAMILY MEDICINE

## 2021-04-29 ASSESSMENT — ENCOUNTER SYMPTOMS: DIARRHEA: 1

## 2021-04-29 NOTE — PATIENT INSTRUCTIONS
Start metamucil or similar psyllium fiber daily. Start with 1 tablespoon daily with 8 oz of water and increase to 2 Tablespoons 1-2 times daily as tolerated to improve regularity.

## 2021-04-29 NOTE — PROGRESS NOTES
Post-Discharge Transitional Care Management Services or Hospital Follow Up      Fahad Morrissey   YOB: 1934    Date of Office Visit:  4/29/2021  Date of Hospital Admission: 4/14/21  Date of Hospital Discharge: 4/20/21    Care management risk score Rising risk (score 2-5) and Complex Care (Scores >=6): 0     Non face to face  following discharge, date last encounter closed (first attempt may have been earlier): *No documented post hospital discharge outreach found in the last 14 days     Call initiated 2 business days of discharge: *No response recorded in the last 14 days    Patient Active Problem List   Diagnosis    Hyperlipidemia    Essential hypertension    Urinary incontinence    Tophi    TIA (transient ischemic attack)    Hypothyroidism (acquired)    Bladder prolapse, female, acquired    Morbid obesity with BMI of 40.0-44.9, adult ([facility] [address]       Allergies   Allergen Reactions    Tramadol      dizziness    Vesicare [Solifenacin]      Swollen fingers       Medications listed as ordered at the time of discharge from [facility]    Medications marked \"taking\" at this time  Outpatient Medications Marked as Taking for the 4/29/21 encounter (Office Visit) with Erich Bennett MD   Medication Sig Dispense Refill    meclizine (ANTIVERT) 25 MG tablet Take 1 tablet by mouth 3 times daily as needed for Dizziness 30 tablet 1    atorvastatin (LIPITOR) 10 MG tablet Take 1 tablet by mouth daily 90 tablet 3    Calcium Carbonate-Vitamin D (OYSTER SHELL CALCIUM/D) 500-200 MG-UNIT TABS Take 1 tablet by mouth 2 times daily (with meals)      allopurinol (ZYLOPRIM) 300 MG tablet take 1 tablet by mouth once daily 90 tablet 3    DULoxetine (CYMBALTA) 60 MG extended release capsule take 1 capsule by mouth once daily 90 capsule 3    levothyroxine (SYNTHROID) 100 MCG tablet take 1 tablet by mouth once daily 90 tablet 3    quinapril (ACCUPRIL) 10 MG tablet Take one tablet by mouth daily 90 tablet 3    colchicine (COLCRYS) 0.6 MG tablet take 1 tablet by mouth once daily 30 tablet 5    aspirin 81 MG EC tablet Take 81 mg by mouth daily      Omega-3 Fatty Acids (FISH OIL PO) Take 2 g by mouth      Multiple Vitamins-Minerals (CENTRUM SILVER PO) Take by mouth daily          Medications patient taking as of now reconciled against medications ordered at time of hospital discharge: Yes    Chief Complaint   Patient presents with    Follow-Up from [address] [place] [address] d/c 4/20/21 [facility] GI bleed    Diarrhea     has been having loose stools since getting home from the hospital        Andrez Beauchamp was admitted to [facility] with acute GI bleeding she was having multiple episodes of loose bloody stool. This was dark melena maroon-colored stools. She was treated with conservative therapy with IV antibiotics for possible colitis fluids and repeat hemoglobins. After 24 hours the bleeding stopped but then restarted. She was kept overnight and given a colonoscopy on the Monday prior to her discharge. She had an active bleeding vessel noted in the colon during that colonoscopy but no other worrisome abnormalities. There was a polyp that was found and this was removed but this was not the source of bleeding. She had 2 tubular adenomas one with low-grade dysplasia removed. She was treated with IV Zosyn while in house due to urine being positive for infection. The urine culture did not grow out anything and the Zosyn was stopped with no further oral antibiotics given. At discharge her hemoglobin was 12.3. Her admission hemoglobin was 15.3. Diarrhea         Inpatient course: Discharge summary reviewed- see chart. Interval history/Current status: Has been having diarrhea--basically very soft stool since she came home from the hospital.  Is taking the colace/ docusate type. Also taking the miralax.   Thought that the constipation that she was having prior to her hospital stay was with trigger for the diverticular bleeding. Does still have some dizziness-- uses the meclizine at night. This is been helpful. She has not had any falls near falls and feeling like she is unsteady when she is walking. No fevers or chills at all. She is not any syncope or near syncope CV like she is having palpitations shortness of breath or leg edema    Vitals:    04/29/21 1430   BP: 114/76   Site: Left Upper Arm   Position: Sitting   Cuff Size: Large Adult   Pulse: 75   Temp: 97.5 °F (36.4 °C)   SpO2: 96%   Weight: 211 lb (95.7 kg)   Height: 5' 2\" (1.575 m)     Body mass index is 38.59 kg/m². Wt Readings from Last 3 Encounters:   04/29/21 211 lb (95.7 kg)   04/14/21 210 lb (95.3 kg)   01/14/21 210 lb (95.3 kg)     BP Readings from Last 3 Encounters:   04/29/21 114/76   04/14/21 125/80   01/14/21 130/80       Review of Systems   Gastrointestinal: Positive for diarrhea. Physical Exam  Vitals signs and nursing note reviewed. Constitutional:       Appearance: Normal appearance. She is well-developed. HENT:      Head: Normocephalic and atraumatic. Eyes:      Conjunctiva/sclera: Conjunctivae normal.   Neck:      Musculoskeletal: Normal range of motion and neck supple. Thyroid: No thyromegaly. Vascular: No JVD. Cardiovascular:      Rate and Rhythm: Normal rate and regular rhythm. Heart sounds: Normal heart sounds. No murmur. No friction rub. No gallop. Pulmonary:      Effort: Pulmonary effort is normal. No respiratory distress. Breath sounds: Normal breath sounds. Abdominal:      Palpations: Abdomen is soft. There is no mass. Musculoskeletal:         General: Deformity present. Right lower leg: No edema. Left lower leg: No edema. Lymphadenopathy:      Cervical: No cervical adenopathy. Skin:     General: Skin is warm. Neurological:      General: No focal deficit present. Mental Status: She is alert and oriented to person, place, and time.    Psychiatric:         Mood and Affect: Mood normal.         Behavior: Behavior normal.         Thought Content: Thought content normal.         Judgment: Judgment normal.       Assessment/Plan:  1. Rectal bleeding  Hemoglobin stable post diverticular bleeding. Avoid constipation. If any recurrence and would want to recheck the hemoglobin at that time    2. Diarrhea of presumed infectious origin  Start metamucil or similar psyllium fiber daily. Start with 1 tablespoon daily with 8 oz of water and increase to 2 Tablespoons 1-2 times daily as tolerated to improve regularity. - SC DISCHARGE MEDS RECONCILED W/ CURRENT OUTPATIENT MED LIST    3. Essential hypertension  Controlled at this time watch for hypotension with her recent bleeding but appears to be tolerating this very well. 4. Benign paroxysmal positional vertigo, unspecified laterality  May continue use the meclizine on an as-needed basis. If it increases could consider referral back to physical therapy for repeat evaluation.         Medical Decision Making: moderate complexity

## 2021-05-08 DIAGNOSIS — M1A.9XX1 TOPHI: ICD-10-CM

## 2021-05-10 NOTE — TELEPHONE ENCOUNTER
Urmila Medrano is requesting a refill on the following medication(s):  Requested Prescriptions     Pending Prescriptions Disp Refills    colchicine (COLCRYS) 0.6 MG tablet [Pharmacy Med Name: COLCHICINE 0.6 MG TABLET] 30 tablet 5     Sig: take 1 tablet by mouth once daily       Last Visit Date (If Applicable):  4/90/6320    Next Visit Date:    6/21/2021

## 2021-05-11 RX ORDER — COLCHICINE 0.6 MG/1
TABLET ORAL
Qty: 30 TABLET | Refills: 5 | Status: SHIPPED | OUTPATIENT
Start: 2021-05-11 | End: 2021-11-01

## 2021-08-02 DIAGNOSIS — H81.10 BENIGN PAROXYSMAL POSITIONAL VERTIGO, UNSPECIFIED LATERALITY: ICD-10-CM

## 2021-08-02 NOTE — TELEPHONE ENCOUNTER
Raza Gan is requesting a refill on the following medication(s):  Requested Prescriptions     Pending Prescriptions Disp Refills    meclizine (ANTIVERT) 25 MG tablet [Pharmacy Med Name: MECLIZINE 25 MG TABLET] 30 tablet 1     Sig: TAKE 1 TABLET BY MOUTH THREE TIMES A DAY AS NEEDED FOR DIZZINESS       Last Visit Date (If Applicable):  2/12/7596    Next Visit Date:    Visit date not found

## 2021-08-04 RX ORDER — MECLIZINE HYDROCHLORIDE 25 MG/1
TABLET ORAL
Qty: 30 TABLET | Refills: 1 | Status: SHIPPED | OUTPATIENT
Start: 2021-08-04 | End: 2021-09-28

## 2021-09-28 DIAGNOSIS — H81.10 BENIGN PAROXYSMAL POSITIONAL VERTIGO, UNSPECIFIED LATERALITY: ICD-10-CM

## 2021-09-28 RX ORDER — MECLIZINE HYDROCHLORIDE 25 MG/1
TABLET ORAL
Qty: 30 TABLET | Refills: 1 | Status: SHIPPED | OUTPATIENT
Start: 2021-09-28 | End: 2021-10-25

## 2021-10-23 DIAGNOSIS — H81.10 BENIGN PAROXYSMAL POSITIONAL VERTIGO, UNSPECIFIED LATERALITY: ICD-10-CM

## 2021-10-25 RX ORDER — MECLIZINE HYDROCHLORIDE 25 MG/1
TABLET ORAL
Qty: 30 TABLET | Refills: 1 | Status: SHIPPED | OUTPATIENT
Start: 2021-10-25 | End: 2021-11-29

## 2021-10-25 NOTE — TELEPHONE ENCOUNTER
Yony Curtis is requesting a refill on the following medication(s):  Requested Prescriptions     Pending Prescriptions Disp Refills    meclizine (ANTIVERT) 25 MG tablet [Pharmacy Med Name: MECLIZINE 25 MG TABLET] 30 tablet 1     Sig: TAKE 1 TABLET BY MOUTH THREE TIMES A DAY AS NEEDED FOR DIZZINESS       Last Visit Date (If Applicable):  5/13/5782    Next Visit Date:    Visit date not found

## 2021-10-30 DIAGNOSIS — M1A.9XX1 TOPHI: ICD-10-CM

## 2021-11-01 RX ORDER — COLCHICINE 0.6 MG/1
TABLET ORAL
Qty: 30 TABLET | Refills: 5 | Status: SHIPPED | OUTPATIENT
Start: 2021-11-01 | End: 2022-05-05

## 2021-11-01 NOTE — TELEPHONE ENCOUNTER
Lydia Pierce is requesting a refill on the following medication(s):  Requested Prescriptions     Pending Prescriptions Disp Refills    colchicine (COLCRYS) 0.6 MG tablet [Pharmacy Med Name: COLCHICINE 0.6 MG TABLET] 30 tablet 5     Sig: take 1 tablet by mouth once daily       Last Visit Date (If Applicable):  7/60/5008    Next Visit Date:    Visit date not found

## 2021-11-25 DIAGNOSIS — H81.10 BENIGN PAROXYSMAL POSITIONAL VERTIGO, UNSPECIFIED LATERALITY: ICD-10-CM

## 2021-11-29 RX ORDER — MECLIZINE HYDROCHLORIDE 25 MG/1
TABLET ORAL
Qty: 30 TABLET | Refills: 1 | Status: SHIPPED | OUTPATIENT
Start: 2021-11-29 | End: 2021-12-28

## 2021-11-29 NOTE — TELEPHONE ENCOUNTER
Jamila James is requesting a refill on the following medication(s):  Requested Prescriptions     Pending Prescriptions Disp Refills    meclizine (ANTIVERT) 25 MG tablet [Pharmacy Med Name: MECLIZINE 25 MG TABLET] 30 tablet 1     Sig: take 1 tablet by mouth three times a day if needed for dizziness       Last Visit Date (If Applicable):  5/94/8105    Next Visit Date:    Visit date not found

## 2021-12-28 DIAGNOSIS — H81.10 BENIGN PAROXYSMAL POSITIONAL VERTIGO, UNSPECIFIED LATERALITY: ICD-10-CM

## 2021-12-28 RX ORDER — MECLIZINE HYDROCHLORIDE 25 MG/1
TABLET ORAL
Qty: 30 TABLET | Refills: 0 | Status: SHIPPED | OUTPATIENT
Start: 2021-12-28 | End: 2022-01-12

## 2021-12-28 NOTE — TELEPHONE ENCOUNTER
Rupinder Martinez is requesting a refill on the following medication(s):  Requested Prescriptions     Pending Prescriptions Disp Refills    meclizine (ANTIVERT) 25 MG tablet [Pharmacy Med Name: MECLIZINE 25 MG TABLET] 30 tablet 0     Sig: take 1 tablet by mouth three times a day if needed for dizziness       Last Visit Date (If Applicable):  0/54/3507    Next Visit Date:    Visit date not found

## 2022-01-12 DIAGNOSIS — H81.10 BENIGN PAROXYSMAL POSITIONAL VERTIGO, UNSPECIFIED LATERALITY: ICD-10-CM

## 2022-01-12 NOTE — TELEPHONE ENCOUNTER
Dudley Steele is requesting a refill on the following medication(s):  Requested Prescriptions     Pending Prescriptions Disp Refills    meclizine (ANTIVERT) 25 MG tablet [Pharmacy Med Name: MECLIZINE 25 MG TABLET] 30 tablet 0     Sig: take 1 tablet by mouth three times a day if needed for dizziness       Last Visit Date (If Applicable):  9/56/1821    Next Visit Date:    Visit date not found

## 2022-01-13 RX ORDER — MECLIZINE HYDROCHLORIDE 25 MG/1
TABLET ORAL
Qty: 30 TABLET | Refills: 0 | Status: SHIPPED | OUTPATIENT
Start: 2022-01-13 | End: 2022-01-27

## 2022-01-27 DIAGNOSIS — H81.10 BENIGN PAROXYSMAL POSITIONAL VERTIGO, UNSPECIFIED LATERALITY: ICD-10-CM

## 2022-01-27 RX ORDER — MECLIZINE HYDROCHLORIDE 25 MG/1
TABLET ORAL
Qty: 30 TABLET | Refills: 0 | Status: SHIPPED | OUTPATIENT
Start: 2022-01-27 | End: 2022-02-13

## 2022-01-27 NOTE — TELEPHONE ENCOUNTER
Antonette Lowery is requesting a refill on the following medication(s):  Requested Prescriptions     Pending Prescriptions Disp Refills    meclizine (ANTIVERT) 25 MG tablet [Pharmacy Med Name: MECLIZINE 25 MG TABLET] 30 tablet 0     Sig: take 1 tablet by mouth three times a day if needed for dizziness       Last Visit Date (If Applicable):  0/16/1164    Next Visit Date:    Visit date not found

## 2022-03-01 DIAGNOSIS — E03.9 HYPOTHYROIDISM (ACQUIRED): ICD-10-CM

## 2022-03-01 RX ORDER — ATORVASTATIN CALCIUM 10 MG/1
TABLET, FILM COATED ORAL
Qty: 90 TABLET | Refills: 3 | Status: SHIPPED | OUTPATIENT
Start: 2022-03-01

## 2022-03-01 RX ORDER — LEVOTHYROXINE SODIUM 0.1 MG/1
TABLET ORAL
Qty: 90 TABLET | Refills: 3 | Status: SHIPPED | OUTPATIENT
Start: 2022-03-01

## 2022-03-01 NOTE — TELEPHONE ENCOUNTER
Dudley Steele is requesting a refill on the following medication(s):  Requested Prescriptions     Pending Prescriptions Disp Refills    levothyroxine (SYNTHROID) 100 MCG tablet [Pharmacy Med Name: LEVOTHYROXINE 100 MCG TABLET] 90 tablet 3     Sig: take 1 tablet by mouth once daily    atorvastatin (LIPITOR) 10 MG tablet [Pharmacy Med Name: ATORVASTATIN 10 MG TABLET] 90 tablet 3     Sig: take 1 tablet by mouth once daily       Last Visit Date (If Applicable):  8/69/2366    Next Visit Date:    3/10/2022

## 2022-03-10 ENCOUNTER — OFFICE VISIT (OUTPATIENT)
Dept: FAMILY MEDICINE CLINIC | Age: 87
End: 2022-03-10
Payer: MEDICARE

## 2022-03-10 VITALS
HEART RATE: 116 BPM | WEIGHT: 193 LBS | OXYGEN SATURATION: 99 % | SYSTOLIC BLOOD PRESSURE: 102 MMHG | DIASTOLIC BLOOD PRESSURE: 66 MMHG | BODY MASS INDEX: 35.3 KG/M2

## 2022-03-10 DIAGNOSIS — G45.9 TIA (TRANSIENT ISCHEMIC ATTACK): ICD-10-CM

## 2022-03-10 DIAGNOSIS — N39.41 URGE INCONTINENCE OF URINE: ICD-10-CM

## 2022-03-10 DIAGNOSIS — E03.9 HYPOTHYROIDISM (ACQUIRED): ICD-10-CM

## 2022-03-10 DIAGNOSIS — I10 ESSENTIAL HYPERTENSION: ICD-10-CM

## 2022-03-10 DIAGNOSIS — E66.01 MORBID OBESITY WITH BMI OF 40.0-44.9, ADULT (HCC): ICD-10-CM

## 2022-03-10 DIAGNOSIS — E78.2 MIXED HYPERLIPIDEMIA: Primary | ICD-10-CM

## 2022-03-10 DIAGNOSIS — N30.01 ACUTE CYSTITIS WITH HEMATURIA: ICD-10-CM

## 2022-03-10 DIAGNOSIS — N81.10 BLADDER PROLAPSE, FEMALE, ACQUIRED: ICD-10-CM

## 2022-03-10 DIAGNOSIS — M1A.9XX1 TOPHI: ICD-10-CM

## 2022-03-10 LAB
ALBUMIN/GLOBULIN RATIO: 1.1 G/DL
ALBUMIN: 4.1 G/DL (ref 3.5–5)
ALP BLD-CCNC: 111 UNITS/L (ref 38–126)
ALT SERPL-CCNC: 15 UNITS/L (ref 4–35)
ANION GAP SERPL CALCULATED.3IONS-SCNC: 11.2 MMOL/L
AST SERPL-CCNC: 32 UNITS/L (ref 14–36)
BACTERIA, URINE: ABNORMAL
BASOPHILS %: 1.26 (ref 0–3)
BASOPHILS ABSOLUTE: 0.05 (ref 0–0.3)
BILIRUB SERPL-MCNC: 0.5 MG/DL (ref 0.2–1.3)
BILIRUBIN URINE: NEGATIVE
BILIRUBIN, POC: NEGATIVE
BLOOD URINE, POC: NORMAL
BLOOD, URINE: ABNORMAL
BUN BLDV-MCNC: 26 MG/DL (ref 7–17)
CALCIUM OXALATE CRYSTALS: ABNORMAL /HPF
CALCIUM SERPL-MCNC: 9.7 MG/DL (ref 8.4–10.2)
CASTS UA: ABNORMAL
CHLORIDE BLD-SCNC: 103 MMOL/L (ref 98–120)
CHOLESTEROL/HDL RATIO: 2.75 RATIO (ref 0–4.5)
CHOLESTEROL: 154 MG/DL (ref 50–200)
CLARITY, POC: NORMAL
CLARITY: ABNORMAL
CO2: 25 MMOL/L (ref 22–31)
COLOR, POC: NORMAL
COLOR, URINE: YELLOW
CREAT SERPL-MCNC: 0.9 MG/DL (ref 0.5–1)
CRYSTALS, UA: PRESENT
EOSINOPHILS %: 1.29 (ref 0–10)
EOSINOPHILS ABSOLUTE: 0.05 (ref 0–1.1)
GFR CALCULATED: > 60
GLOBULIN: 3.9 G/DL
GLUCOSE URINE, POC: NEGATIVE
GLUCOSE URINE: NEGATIVE MG/DL
GLUCOSE: 94 MG/DL (ref 65–105)
HCT VFR BLD CALC: 53.5 % (ref 37–47)
HDLC SERPL-MCNC: 56 MG/DL (ref 36–68)
HEMOGLOBIN: 15.9 (ref 12–16)
KETONES, POC: NEGATIVE
KETONES, URINE: NEGATIVE MG/DL
LDL CHOLESTEROL CALCULATED: 74 MG/DL (ref 0–160)
LEUKOCYTE EST, POC: NORMAL
LEUKOCYTE ESTERASE, URINE: ABNORMAL
LYMPHOCYTE %: 44.78 (ref 20–51.1)
LYMPHOCYTES ABSOLUTE: 1.72 (ref 1–5.5)
MCH RBC QN AUTO: 29.3 PG (ref 28.5–32.5)
MCHC RBC AUTO-ENTMCNC: 29.7 G/DL (ref 32–37)
MCV RBC AUTO: 98.6 FL (ref 80–94)
MONOCYTES %: 12.23 (ref 1.7–9.3)
MONOCYTES ABSOLUTE: 0.47 (ref 0.1–1)
MUCUS, URINE: ABNORMAL
NEUTROPHILS %: 40.44 (ref 42.2–75.2)
NEUTROPHILS ABSOLUTE: 1.55 (ref 2–8.1)
NITRITE, POC: NEGATIVE
NITRITE, URINE: NEGATIVE
PDW BLD-RTO: 15.4 % (ref 8.5–15.5)
PH UA: 8.5 (ref 5–8.5)
PH, POC: 8.5
PLATELET # BLD: 341.3 THOU/MM3 (ref 130–400)
POTASSIUM SERPL-SCNC: 4.7 MMOL/L (ref 3.6–5)
PROTEIN UA: ABNORMAL MG/DL
PROTEIN, POC: NORMAL
RBC URINE: ABNORMAL (ref 0–2)
RBC: 5.43 M/UL (ref 4.2–5.4)
SODIUM BLD-SCNC: 139 MMOL/L (ref 135–145)
SPECIFIC GRAVITY, POC: 1
SPECIFIC GRAVITY, URINE: 1.01 MG/DL (ref 1–1.03)
SQUAMOUS EPITHELIAL: ABNORMAL
T4 FREE: 1.4 NG/DL (ref 0.78–2.19)
TOTAL PROTEIN, SERUM: 7.9 G/DL (ref 6.3–8.2)
TRICHOMONAS, URINE: ABNORMAL
TRIGL SERPL-MCNC: 120 MG/DL (ref 10–250)
TSH SERPL DL<=0.05 MIU/L-ACNC: 1.29 MIU/ML (ref 0.49–4.67)
URIC ACID: 3.9 MG/DL (ref 3.5–8.5)
UROBILINOGEN, POC: 0.2
UROBILINOGEN, URINE: 0.2 MG/DL (ref 0.2–1)
VLDLC SERPL CALC-MCNC: 24 MG/DL (ref 0–50)
WBC URINE: ABNORMAL (ref 0–4)
WBC: 3.8 THOU/ML3 (ref 4.8–10.8)
YEAST, URINE: ABNORMAL

## 2022-03-10 PROCEDURE — 99212 OFFICE O/P EST SF 10 MIN: CPT | Performed by: FAMILY MEDICINE

## 2022-03-10 PROCEDURE — 1036F TOBACCO NON-USER: CPT | Performed by: FAMILY MEDICINE

## 2022-03-10 PROCEDURE — 1123F ACP DISCUSS/DSCN MKR DOCD: CPT | Performed by: FAMILY MEDICINE

## 2022-03-10 PROCEDURE — G8417 CALC BMI ABV UP PARAM F/U: HCPCS | Performed by: FAMILY MEDICINE

## 2022-03-10 PROCEDURE — PBSHW POCT URINALYSIS DIPSTICK: Performed by: FAMILY MEDICINE

## 2022-03-10 PROCEDURE — 0509F URINE INCON PLAN DOCD: CPT | Performed by: FAMILY MEDICINE

## 2022-03-10 PROCEDURE — 4040F PNEUMOC VAC/ADMIN/RCVD: CPT | Performed by: FAMILY MEDICINE

## 2022-03-10 PROCEDURE — G8484 FLU IMMUNIZE NO ADMIN: HCPCS | Performed by: FAMILY MEDICINE

## 2022-03-10 PROCEDURE — 99214 OFFICE O/P EST MOD 30 MIN: CPT | Performed by: FAMILY MEDICINE

## 2022-03-10 PROCEDURE — 1090F PRES/ABSN URINE INCON ASSESS: CPT | Performed by: FAMILY MEDICINE

## 2022-03-10 PROCEDURE — 81002 URINALYSIS NONAUTO W/O SCOPE: CPT | Performed by: FAMILY MEDICINE

## 2022-03-10 PROCEDURE — G8427 DOCREV CUR MEDS BY ELIG CLIN: HCPCS | Performed by: FAMILY MEDICINE

## 2022-03-10 RX ORDER — CEPHALEXIN 500 MG/1
1000 CAPSULE ORAL 2 TIMES DAILY
Qty: 40 CAPSULE | Refills: 0 | Status: SHIPPED | OUTPATIENT
Start: 2022-03-10 | End: 2022-03-20

## 2022-03-10 RX ORDER — ASPIRIN 81 MG/1
81 TABLET ORAL DAILY
Qty: 30 TABLET | Refills: 5
Start: 2022-03-10

## 2022-03-10 SDOH — ECONOMIC STABILITY: FOOD INSECURITY: WITHIN THE PAST 12 MONTHS, YOU WORRIED THAT YOUR FOOD WOULD RUN OUT BEFORE YOU GOT MONEY TO BUY MORE.: NEVER TRUE

## 2022-03-10 SDOH — ECONOMIC STABILITY: FOOD INSECURITY: WITHIN THE PAST 12 MONTHS, THE FOOD YOU BOUGHT JUST DIDN'T LAST AND YOU DIDN'T HAVE MONEY TO GET MORE.: NEVER TRUE

## 2022-03-10 ASSESSMENT — SOCIAL DETERMINANTS OF HEALTH (SDOH): HOW HARD IS IT FOR YOU TO PAY FOR THE VERY BASICS LIKE FOOD, HOUSING, MEDICAL CARE, AND HEATING?: NOT HARD AT ALL

## 2022-03-10 ASSESSMENT — PATIENT HEALTH QUESTIONNAIRE - PHQ9
SUM OF ALL RESPONSES TO PHQ9 QUESTIONS 1 & 2: 0
SUM OF ALL RESPONSES TO PHQ QUESTIONS 1-9: 0
2. FEELING DOWN, DEPRESSED OR HOPELESS: 0
SUM OF ALL RESPONSES TO PHQ QUESTIONS 1-9: 0
1. LITTLE INTEREST OR PLEASURE IN DOING THINGS: 0

## 2022-03-10 NOTE — PATIENT INSTRUCTIONS
Stop the quinapril all together. Stop back in the office to have the nurse check the blood pressure when you come to town in the next few weeks.

## 2022-03-10 NOTE — PROGRESS NOTES
[address] [facility]  [address]  Dept: [phone]  Dept WTQ:[phone]    Alexa Napoles is a 80 y.o. female who presents today for her medical conditions/complaints as notedbelow. Alexa Napoles is c/o of Hypertension (follow up) and Urinary Frequency (frequency and incontinence )      HPI:     HPI    Has the ongoing incontinence. Most of the urine is in her brief and not in the stool. Has had burning with urination about the last month. No fevers or chills at all. Was on the mybetriq in the past.  Was very expensive and stopped this. Not sure it helped or not-- does not remember. Had hysterectomy in 2018 with anterior repair of cystocele at that raquel. Has not been eating as much. Is trying to lose some weight. Gets her meals from the [employer] and then will have small meal earlier in the day and no snack after supper. Has not been eating the candy etc.  Does feel a bit dizzy and lightheaded at times. When she gets up at night she has to wait a minute before she takes off to make sure she does not get lightheaded or dizzy. Bowels been moving fine. no other signs or symptoms or constipation. No changes in nails or hair. Takes her hypertension hyperlipidemia medications regularly. Has no chest pain shortness of breath exertional dyspnea orthopnea. She is slower than she used to be. Does still try to stay active. Most frustrating issue for her is her hand dysfunction.     BP Readings from Last 3 Encounters:   03/10/22 102/66   04/29/21 114/76   04/14/21 125/80          (goal 120/80)    Wt Readings from Last 3 Encounters:   03/10/22 193 lb (87.5 kg)   04/29/21 211 lb (95.7 kg)   04/14/21 210 lb (95.3 kg)        Past Medical History:   Diagnosis Date    Bladder prolapse, female, acquired     with uterine prolapse and urinary retention    Goiter 2004    CT    HTN (hypertension)     Hyperlipidemia     Hypothyroidism (acquired)     TIA (transient ischemic attack)       Past Surgical History:   Procedure Laterality Date    BREAST BIOPSY Right 10/2008    benign    CATARACT REMOVAL Left 07/2010    COLONOSCOPY  2007    normal    COLONOSCOPY  04/19/2021    Repeat in 5 years   [id number] [facility] UTERUS  10/25/2017    FOOT SURGERY      bunions and hammertoes    HYSTERECTOMY, TOTAL ABDOMINAL  01/09/2018    KNEE ARTHROPLASTY Bilateral     LEEP  2002       Family History   Problem Relation Age of Onset    Other Other         polycythemia vera    Other Daughter         polycystic ovary       Social History     Tobacco Use    Smoking status: Never Smoker    Smokeless tobacco: Never Used   Substance Use Topics    Alcohol use: Yes     Comment: socially      Current Outpatient Medications   Medication Sig Dispense Refill    aspirin 81 MG EC tablet Take 1 tablet by mouth daily 30 tablet 5    mirabegron (MYRBETRIQ) 25 MG TB24 Take 1 tablet by mouth daily 30 tablet 5    cephALEXin (KEFLEX) 500 MG capsule Take 2 capsules by mouth 2 times daily for 10 days 40 capsule 0    levothyroxine (SYNTHROID) 100 MCG tablet take 1 tablet by mouth once daily 90 tablet 3    atorvastatin (LIPITOR) 10 MG tablet take 1 tablet by mouth once daily 90 tablet 3    colchicine (COLCRYS) 0.6 MG tablet take 1 tablet by mouth once daily 30 tablet 5    Calcium Carbonate-Vitamin D (OYSTER SHELL CALCIUM/D) 500-200 MG-UNIT TABS Take 1 tablet by mouth 2 times daily (with meals)      DULoxetine (CYMBALTA) 60 MG extended release capsule take 1 capsule by mouth once daily 90 capsule 3    Omega-3 Fatty Acids (FISH OIL PO) Take 2 g by mouth      Multiple Vitamins-Minerals (CENTRUM SILVER PO) Take by mouth daily      allopurinol (ZYLOPRIM) 300 MG tablet take 1 tablet by mouth once daily 90 tablet 3    meclizine (ANTIVERT) 25 MG tablet take 1 tablet by mouth three times a day if needed for dizziness 30 tablet 1     No current facility-administered medications for this visit. Allergies   Allergen Reactions    Tramadol      dizziness    Vesicare [Solifenacin]      Swollen fingers       Health Maintenance   Topic Date Due    COVID-19 Vaccine (1) Never done    Flu vaccine (1) 09/01/2021    Lipid screen  03/10/2023    Depression Screen  03/10/2023    TSH testing  03/10/2023    Potassium monitoring  03/10/2023    Creatinine monitoring  03/10/2023    DTaP/Tdap/Td vaccine (2 - Td or Tdap) 11/13/2028    Shingles Vaccine  Completed    Pneumococcal 65+ years Vaccine  Completed    Hepatitis A vaccine  Aged Out    Hepatitis B vaccine  Aged Out    Hib vaccine  Aged Out    Meningococcal (ACWY) vaccine  Aged Out       Subjective:      Review of Systems    Objective:     /66 (Site: Left Upper Arm, Position: Sitting, Cuff Size: Large Adult)   Pulse 116   Wt 193 lb (87.5 kg)   SpO2 99%   BMI 35.30 kg/m²     Physical Exam  Vitals and nursing note reviewed. Constitutional:       Appearance: Normal appearance. She is well-developed. HENT:      Head: Normocephalic and atraumatic. Eyes:      Conjunctiva/sclera: Conjunctivae normal.   Neck:      Thyroid: No thyromegaly. Vascular: No JVD. Cardiovascular:      Rate and Rhythm: Normal rate and regular rhythm. Heart sounds: Normal heart sounds. No murmur heard. No friction rub. No gallop. Pulmonary:      Effort: Pulmonary effort is normal. No respiratory distress. Breath sounds: Normal breath sounds. Abdominal:      Palpations: Abdomen is soft. There is no mass. Tenderness: There is no abdominal tenderness. Musculoskeletal:         General: Deformity present. No tenderness. Cervical back: Normal range of motion and neck supple. Right lower leg: No edema. Left lower leg: No edema. 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    Skin:     General: Skin is warm. Neurological:      General: No focal deficit present. Mental Status: She is alert and oriented to person, place, and time. Psychiatric:         Mood and Affect: Mood normal.         Behavior: Behavior normal.         Thought Content: Thought content normal.         Judgment: Judgment normal.         Assessment/Plan:     1. Mixed hyperlipidemia  2. Essential hypertension  3. Urge incontinence of urine  -     mirabegron (MYRBETRIQ) 25 MG TB24; Take 1 tablet by mouth daily, Disp-30 tablet, R-5Normal  -     POCT Urinalysis no Micro  -     Culture, Urine; Future  4. Tophi  5. Hypothyroidism (acquired)  6. TIA (transient ischemic attack)  -     aspirin 81 MG EC tablet; Take 1 tablet by mouth daily, Disp-30 tablet, R-5Adjust Sig  7. Bladder prolapse, female, acquired  8. Morbid obesity with BMI of 40.0-44.9, adult ([facility] [address]  9. Acute cystitis with hematuria  -     cephALEXin (KEFLEX) 500 MG capsule; Take 2 capsules by mouth 2 times daily for 10 days, Disp-40 capsule, R-0Normal    Continue on her current medications for hyperlipidemia and hypertension both  Are asymptomatic although has some orthostatic sx. Check labs to ensure she is at goal with the lipids as well has with the thyroid to ensure euthyroid. Asymptomatic on the thyroid. No signs or symptoms of her previous TIA. Continue a baby aspirin for prophylaxis. Could consider trying the Myrbetriq again if she would like for the urinary incontinence. Has a history of the bladder prolapse repair. Would consider referral to urology to see if there are other options available. With her already episodic possible orthostatic symptoms would be hesitant to try anticholinergics again. Positive urine test today we will treat with cephalexin and sent for culture. Patient Instructions   Stop the quinapril all together. Stop back in the office to have the nurse check the blood pressure when you come to town in the next few weeks.           Lab Results   Component Value Date    WBC 3.8 (L) 03/10/2022    HGB 15.9 03/10/2022    HCT 53.5 (H) 03/10/2022    .3 03/10/2022    CHOL 154 03/10/2022    TRIG 120 03/10/2022    HDL 56 03/10/2022    ALT 15 03/10/2022    AST 32 03/10/2022     03/10/2022    K 4.7 03/10/2022     03/10/2022    CREATININE 0.9 03/10/2022    BUN 26 (H) 03/10/2022    CO2 25 03/10/2022    TSH 1.29 03/10/2022    INR 0.9 10/22/2017       Return in about 6 months (around 9/10/2022) for AWV. Multiple labs and other testing may have been ordered which may not be completely evident from the above note due to system interface incompatibilities. Patient given educational materials - see patientinstructions. Discussed use, benefit, and side effects of prescribed medications. All patient questions answered. Pt voiced understanding. Reviewed health maintenance. Instructed to continue current medications, diet andexercise. Patient agreed with treatment plan. Follow up as directed.      (Please note that portions of this note were completed with a voice-recognition program. Efforts were made to edit the dictation but occasionally words are mis-transcribed.)    Electronically signed by Jeff Sinclair MD on 3/20/2022

## 2022-03-13 DIAGNOSIS — H81.10 BENIGN PAROXYSMAL POSITIONAL VERTIGO, UNSPECIFIED LATERALITY: ICD-10-CM

## 2022-03-14 DIAGNOSIS — N39.41 URGE INCONTINENCE OF URINE: ICD-10-CM

## 2022-03-14 RX ORDER — MECLIZINE HYDROCHLORIDE 25 MG/1
TABLET ORAL
Qty: 30 TABLET | Refills: 1 | Status: SHIPPED | OUTPATIENT
Start: 2022-03-14 | End: 2022-04-05

## 2022-03-14 NOTE — TELEPHONE ENCOUNTER
Thao Dukes is requesting a refill on the following medication(s):  Requested Prescriptions     Pending Prescriptions Disp Refills    meclizine (ANTIVERT) 25 MG tablet [Pharmacy Med Name: MECLIZINE 25 MG TABLET] 30 tablet 1     Sig: take 1 tablet by mouth three times a day if needed for dizziness       Last Visit Date (If Applicable):  0/60/2568    Next Visit Date:    9/15/2022

## 2022-03-15 DIAGNOSIS — M1A.9XX1 TOPHI: ICD-10-CM

## 2022-03-15 NOTE — TELEPHONE ENCOUNTER
Sydney Odonnell is requesting a refill on the following medication(s):  Requested Prescriptions     Pending Prescriptions Disp Refills    allopurinol (ZYLOPRIM) 300 MG tablet [Pharmacy Med Name: ALLOPURINOL 300 MG TABLET] 90 tablet 3     Sig: take 1 tablet by mouth once daily       Last Visit Date (If Applicable):  6/89/2339    Next Visit Date:    Visit date not found

## 2022-03-16 RX ORDER — ALLOPURINOL 300 MG/1
TABLET ORAL
Qty: 90 TABLET | Refills: 3 | Status: SHIPPED | OUTPATIENT
Start: 2022-03-16

## 2022-03-23 DIAGNOSIS — I10 ESSENTIAL HYPERTENSION: ICD-10-CM

## 2022-03-23 NOTE — TELEPHONE ENCOUNTER
Therapy completed 3/10/22?       Smitha Sanford is requesting a refill on the following medication(s):  Requested Prescriptions     Pending Prescriptions Disp Refills    quinapril (ACCUPRIL) 10 MG tablet [Pharmacy Med Name: QUINAPRIL 10 MG TABLET] 90 tablet 3     Sig: take 1 tablet by mouth once daily       Last Visit Date (If Applicable):  5/96/4814    Next Visit Date:    9/15/2022

## 2022-03-24 RX ORDER — QUINAPRIL 10 MG/1
TABLET ORAL
Qty: 90 TABLET | Refills: 3 | OUTPATIENT
Start: 2022-03-24

## 2022-03-30 NOTE — TELEPHONE ENCOUNTER
Dudley Steele is requesting a refill on the following medication(s):  Requested Prescriptions     Pending Prescriptions Disp Refills    DULoxetine (CYMBALTA) 60 MG extended release capsule [Pharmacy Med Name: DULOXETINE HCL DR 60 MG CAP] 90 capsule 3     Sig: take 1 capsule by mouth once daily       Last Visit Date (If Applicable):  8/43/4303    Next Visit Date:    9/15/2022

## 2022-03-31 RX ORDER — DULOXETIN HYDROCHLORIDE 60 MG/1
CAPSULE, DELAYED RELEASE ORAL
Qty: 90 CAPSULE | Refills: 3 | Status: SHIPPED | OUTPATIENT
Start: 2022-03-31

## 2022-04-04 DIAGNOSIS — H81.10 BENIGN PAROXYSMAL POSITIONAL VERTIGO, UNSPECIFIED LATERALITY: ICD-10-CM

## 2022-04-04 NOTE — TELEPHONE ENCOUNTER
Candie Cortez is requesting a refill on the following medication(s):  Requested Prescriptions     Pending Prescriptions Disp Refills    meclizine (ANTIVERT) 25 MG tablet [Pharmacy Med Name: MECLIZINE 25 MG TABLET] 30 tablet 1     Sig: take 1 tablet by mouth three times a day if needed for dizziness       Last Visit Date (If Applicable):  7/37/3998    Next Visit Date:    9/15/2022

## 2022-04-05 RX ORDER — MECLIZINE HYDROCHLORIDE 25 MG/1
TABLET ORAL
Qty: 30 TABLET | Refills: 1 | Status: SHIPPED | OUTPATIENT
Start: 2022-04-05 | End: 2022-09-29

## 2022-04-28 DIAGNOSIS — H81.10 BENIGN PAROXYSMAL POSITIONAL VERTIGO, UNSPECIFIED LATERALITY: ICD-10-CM

## 2022-04-28 RX ORDER — MECLIZINE HYDROCHLORIDE 25 MG/1
TABLET ORAL
Qty: 30 TABLET | Refills: 1 | OUTPATIENT
Start: 2022-04-28

## 2022-05-05 DIAGNOSIS — M1A.9XX1 TOPHI: ICD-10-CM

## 2022-05-05 RX ORDER — COLCHICINE 0.6 MG/1
TABLET ORAL
Qty: 30 TABLET | Refills: 0 | Status: SHIPPED | OUTPATIENT
Start: 2022-05-05 | End: 2022-05-31

## 2022-05-05 NOTE — TELEPHONE ENCOUNTER
Jose Alberto Jauregui is requesting a refill on the following medication(s):  Requested Prescriptions     Pending Prescriptions Disp Refills    colchicine (COLCRYS) 0.6 MG tablet [Pharmacy Med Name: COLCHICINE 0.6 MG TABLET] 30 tablet 0     Sig: take 1 tablet by mouth once daily       Last Visit Date (If Applicable):  0/14/6805    Next Visit Date:    9/15/2022

## 2022-05-30 DIAGNOSIS — M1A.9XX1 TOPHI: ICD-10-CM

## 2022-05-31 RX ORDER — COLCHICINE 0.6 MG/1
TABLET ORAL
Qty: 30 TABLET | Refills: 5 | Status: SHIPPED | OUTPATIENT
Start: 2022-05-31

## 2022-09-19 ENCOUNTER — OFFICE VISIT (OUTPATIENT)
Dept: FAMILY MEDICINE CLINIC | Age: 87
End: 2022-09-19
Payer: MEDICARE

## 2022-09-19 VITALS
HEIGHT: 62 IN | WEIGHT: 195 LBS | OXYGEN SATURATION: 97 % | DIASTOLIC BLOOD PRESSURE: 86 MMHG | SYSTOLIC BLOOD PRESSURE: 130 MMHG | HEART RATE: 91 BPM | BODY MASS INDEX: 35.88 KG/M2

## 2022-09-19 DIAGNOSIS — E03.9 HYPOTHYROIDISM (ACQUIRED): ICD-10-CM

## 2022-09-19 DIAGNOSIS — Z23 NEED FOR INFLUENZA VACCINATION: ICD-10-CM

## 2022-09-19 DIAGNOSIS — I10 ESSENTIAL HYPERTENSION: ICD-10-CM

## 2022-09-19 DIAGNOSIS — Z00.00 MEDICARE ANNUAL WELLNESS VISIT, SUBSEQUENT: Primary | ICD-10-CM

## 2022-09-19 DIAGNOSIS — E78.2 MIXED HYPERLIPIDEMIA: ICD-10-CM

## 2022-09-19 PROCEDURE — 90694 VACC AIIV4 NO PRSRV 0.5ML IM: CPT | Performed by: FAMILY MEDICINE

## 2022-09-19 PROCEDURE — PBSHW INFLUENZA, FLUAD, (AGE 65 Y+), IM, PF, 0.5 ML: Performed by: FAMILY MEDICINE

## 2022-09-19 PROCEDURE — 99213 OFFICE O/P EST LOW 20 MIN: CPT | Performed by: FAMILY MEDICINE

## 2022-09-19 PROCEDURE — G0439 PPPS, SUBSEQ VISIT: HCPCS | Performed by: FAMILY MEDICINE

## 2022-09-19 PROCEDURE — 1123F ACP DISCUSS/DSCN MKR DOCD: CPT | Performed by: FAMILY MEDICINE

## 2022-09-19 PROCEDURE — G0008 ADMIN INFLUENZA VIRUS VAC: HCPCS | Performed by: FAMILY MEDICINE

## 2022-09-19 ASSESSMENT — PATIENT HEALTH QUESTIONNAIRE - PHQ9
1. LITTLE INTEREST OR PLEASURE IN DOING THINGS: 2
SUM OF ALL RESPONSES TO PHQ9 QUESTIONS 1 & 2: 2
SUM OF ALL RESPONSES TO PHQ QUESTIONS 1-9: 2
2. FEELING DOWN, DEPRESSED OR HOPELESS: 0

## 2022-09-19 ASSESSMENT — LIFESTYLE VARIABLES
HOW OFTEN DO YOU HAVE A DRINK CONTAINING ALCOHOL: NEVER
HOW MANY STANDARD DRINKS CONTAINING ALCOHOL DO YOU HAVE ON A TYPICAL DAY: PATIENT DOES NOT DRINK

## 2022-09-19 NOTE — PROGRESS NOTES
Have you had an allergic reaction to the flu (influenza) shot? no  Are you allergic to eggs or any component of the flu vaccine? no  Do you have a history of Guillain-[place] Syndrome (GBS), which is paralysis after receiving the flu vaccine? no  Are you feeling well today? yes  Flu vaccine given as ordered. Patient tolerated it well. No questions re: VIS information. After obtaining consent, and per orders of Dr. Epifanio Wilson, injection of FLU VACCINE given in Left deltoid by Kayleigh Barba LPN. Patient tolerated well. Patient instructed to remain in clinic for 20 minutes afterwards, and to report any adverse reaction immediately.

## 2022-09-19 NOTE — PROGRESS NOTES
Medicare Annual Wellness Visit    Macey Patel is here for Medicare AWV, Hypertension (6mo follow up), and Mouth Lesions (Gets sores in her mouth everytime she eats anything with nuts in it)    Assessment & Plan   Medicare annual wellness visit, subsequent  Need for influenza vaccination  -     Influenza, FLUAD, (age 72 y+), IM, PF, 0.5 mL  Mixed hyperlipidemia  -     Comprehensive Metabolic Panel; Future  -     Lipid Panel; Future  Hypothyroidism (acquired)  -     TSH; Future  -     T4, Free; Future  Essential hypertension  -     Comprehensive Metabolic Panel; Future  -     CBC with Auto Differential; Future    Recommendations for Preventive Services Due: see orders and patient instructions/AVS.    Topical pain relief for the oral ulcerations. Make sure taking a multivitamin daily to ensure adequate zinc.    Hypertension hyperlipidemia hypothyroidism all well controlled. No changes in current medications. Discussed considering physical therapy for gait if continued unsteadiness. No focal abnormalities with gait or neurologic deficits on exam.    Recommended screening schedule for the next 5-10 years is provided to the patient in written form: see Patient Instructions/AVS.     Return for Medicare Annual Wellness Visit in 1 year. Subjective   The following acute and/or chronic problems were also addressed today:  Has some ongoing problems with her balance. No recent falls but is really careful. No sx from her hypertension or hyperlipidemia. No symptoms of her hypothyroidism. Specifically no chest pain shortness of breath or exertional dyspnea. No palpitations no lower extremity edema. No PND orthopnea. Is taking all of her medications regularly. Has had some intermittent mouth sores. One on the left and then one on the lower gum-- the left has healed up but the one under lip is new.       Patient's complete Health Risk Assessment and screening values have been reviewed and are found in Flowsheets. The following problems were reviewed today and where indicated follow up appointments were made and/or referrals ordered. Positive Risk Factor Screenings with Interventions:    Fall Risk:  Do you feel unsteady or are you worried about falling? : (!) yes  2 or more falls in past year?: no  Fall with injury in past year?: no   Fall Risk Interventions:    Home safety tips provided            General Health and ACP:  General  In general, how would you say your health is?: Good  In the past 7 days, have you experienced any of the following: New or Increased Pain, New or Increased Fatigue, Loneliness, Social Isolation, Stress or Anger?: No  Do you get the social and emotional support that you need?: Yes  Do you have a Living Will?: Yes    Advance Directives       Power of  Living Will ACP-Advance Directive ACP-Power of     Not on File Not on File Not on File Not on File        General Health Risk Interventions:  None indicated -- will bring in a copy of her living will and POA      Health Habits/Nutrition:  Physical Activity: Inactive    Days of Exercise per Week: 0 days    Minutes of Exercise per Session: 0 min     Have you lost any weight without trying in the past 3 months?: No  Body mass index: (!) 35.66  Have you seen the dentist within the past year?: Yes  Health Habits/Nutrition Interventions:  None indicated          Wt Readings from Last 3 Encounters:   09/19/22 195 lb (88.5 kg)   03/10/22 193 lb (87.5 kg)   04/29/21 211 lb (95.7 kg)          Objective   Vitals:    09/19/22 1331   BP: 130/86   Site: Left Upper Arm   Position: Sitting   Cuff Size: Large Adult   Pulse: 91   SpO2: 97%   Weight: 195 lb (88.5 kg)   Height: 5' 2\" (1.575 m)      Body mass index is 35.67 kg/m². Physical Exam  Vitals and nursing note reviewed. Constitutional:       Appearance: Normal appearance. She is well-developed. HENT:      Head: Normocephalic and atraumatic.       Comments: Small active open aphthous ulcer btw the lower lip and gum in the middle no other obvious oral lesions  Eyes:      Conjunctiva/sclera: Conjunctivae normal.   Neck:      Thyroid: No thyromegaly. Vascular: No JVD. Cardiovascular:      Rate and Rhythm: Normal rate and regular rhythm. Heart sounds: Normal heart sounds. No murmur heard. No friction rub. No gallop. Pulmonary:      Effort: Pulmonary effort is normal. No respiratory distress. Breath sounds: Normal breath sounds. Abdominal:      Palpations: Abdomen is soft. There is no mass. Tenderness: There is no abdominal tenderness. Musculoskeletal:         General: Deformity present. No tenderness. Cervical back: Normal range of motion and neck supple. Right lower leg: No edema. Left lower leg: No edema. 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 Skin:     General: Skin is warm. Neurological:      General: No focal deficit present. Mental Status: She is alert and oriented to person, place, and time. Psychiatric:         Mood and Affect: Mood normal.         Behavior: Behavior normal.         Thought Content: Thought content normal.         Judgment: Judgment normal.            Allergies   Allergen Reactions    Tramadol      dizziness    Vesicare [Solifenacin]      Swollen fingers     Prior to Visit Medications    Medication Sig Taking?  Authorizing Provider   colchicine (COLCRYS) 0.6 MG tablet take 1 tablet by mouth once daily Yes Lakeshia Lane MD   meclizine (ANTIVERT) 25 MG tablet take 1 tablet by mouth three times a day if needed for dizziness Yes Lakeshia Lane MD   DULoxetine (CYMBALTA) 60 MG extended release capsule take 1 capsule by mouth once daily Yes Lakeshia Lane MD   allopurinol (ZYLOPRIM) 300 MG tablet take 1 tablet by mouth once daily Yes Lakeshia Lane MD   aspirin 81 MG EC tablet Take 1

## 2022-09-19 NOTE — PATIENT INSTRUCTIONS
Personalized Preventive Plan for Puma Shea - 9/19/2022  Medicare offers a range of preventive health benefits. Some of the tests and screenings are paid in full while other may be subject to a deductible, co-insurance, and/or copay. Some of these benefits include a comprehensive review of your medical history including lifestyle, illnesses that may run in your family, and various assessments and screenings as appropriate. After reviewing your medical record and screening and assessments performed today your provider may have ordered immunizations, labs, imaging, and/or referrals for you. A list of these orders (if applicable) as well as your Preventive Care list are included within your After Visit Summary for your review. Other Preventive Recommendations:    A preventive eye exam performed by an eye specialist is recommended every 1-2 years to screen for glaucoma; cataracts, macular degeneration, and other eye disorders. A preventive dental visit is recommended every 6 months. Try to get at least 150 minutes of exercise per week or 10,000 steps per day on a pedometer . Order or download the FREE \"Exercise & Physical Activity: Your Everyday Guide\" from [employer]. Call 5-965.610.3256 or search [employer] online. You need 6214-7517 mg of calcium and 9974-1537 IU of vitamin D per day. It is possible to meet your calcium requirement with diet alone, but a vitamin D supplement is usually necessary to meet this goal.  When exposed to the sun, use a sunscreen that protects against both UVA and UVB radiation with an SPF of 30 or greater. Reapply every 2 to 3 hours or after sweating, drying off with a towel, or swimming. Always wear a seat belt when traveling in a car. Always wear a helmet when riding a bicycle or motorcycle. Personalized Preventive Plan for Puma Shea - 9/19/2022  Medicare offers a range of preventive health benefits.  Some of the tests and screenings are paid in full while other may be subject to a deductible, co-insurance, and/or copay. Some of these benefits include a comprehensive review of your medical history including lifestyle, illnesses that may run in your family, and various assessments and screenings as appropriate. After reviewing your medical record and screening and assessments performed today your provider may have ordered immunizations, labs, imaging, and/or referrals for you. A list of these orders (if applicable) as well as your Preventive Care list are included within your After Visit Summary for your review. Other Preventive Recommendations:    A preventive eye exam performed by an eye specialist is recommended every 1-2 years to screen for glaucoma; cataracts, macular degeneration, and other eye disorders. A preventive dental visit is recommended every 6 months. Try to get at least 150 minutes of exercise per week or 10,000 steps per day on a pedometer . Order or download the FREE \"Exercise & Physical Activity: Your Everyday Guide\" from [employer]. Call 2-403.388.4116 or search [employer] online. You need 1934-0416 mg of calcium and 9994-8830 IU of vitamin D per day. It is possible to meet your calcium requirement with diet alone, but a vitamin D supplement is usually necessary to meet this goal.  When exposed to the sun, use a sunscreen that protects against both UVA and UVB radiation with an SPF of 30 or greater. Reapply every 2 to 3 hours or after sweating, drying off with a towel, or swimming. Always wear a seat belt when traveling in a car. Always wear a helmet when riding a bicycle or motorcycle.

## 2022-09-29 DIAGNOSIS — H81.10 BENIGN PAROXYSMAL POSITIONAL VERTIGO, UNSPECIFIED LATERALITY: ICD-10-CM

## 2022-09-29 RX ORDER — MECLIZINE HYDROCHLORIDE 25 MG/1
TABLET ORAL
Qty: 30 TABLET | Refills: 1 | Status: SHIPPED | OUTPATIENT
Start: 2022-09-29

## 2022-09-29 NOTE — TELEPHONE ENCOUNTER
Ana Laura Terrell is requesting a refill on the following medication(s):  Requested Prescriptions     Pending Prescriptions Disp Refills    meclizine (ANTIVERT) 25 MG tablet [Pharmacy Med Name: MECLIZINE 25 MG TABLET] 30 tablet 1     Sig: take 1 tablet by mouth three times a day if needed for dizziness       Last Visit Date (If Applicable):  0/92/9308    Next Visit Date:    3/20/2023

## 2022-11-28 DIAGNOSIS — H81.10 BENIGN PAROXYSMAL POSITIONAL VERTIGO, UNSPECIFIED LATERALITY: ICD-10-CM

## 2022-11-28 RX ORDER — MECLIZINE HYDROCHLORIDE 25 MG/1
TABLET ORAL
Qty: 30 TABLET | Refills: 1 | Status: SHIPPED | OUTPATIENT
Start: 2022-11-28

## 2022-11-28 NOTE — TELEPHONE ENCOUNTER
Merary Parish is requesting a refill on the following medication(s):  Requested Prescriptions     Pending Prescriptions Disp Refills    meclizine (ANTIVERT) 25 MG tablet [Pharmacy Med Name: MECLIZINE 25 MG TABLET] 30 tablet 1     Sig: take 1 tablet by mouth three times a day if needed for dizziness       Last Visit Date (If Applicable):  0/41/3614    Next Visit Date:    3/20/2023

## 2022-12-14 DIAGNOSIS — M1A.9XX1 TOPHI: ICD-10-CM

## 2022-12-14 RX ORDER — COLCHICINE 0.6 MG/1
TABLET ORAL
Qty: 30 TABLET | Refills: 5 | Status: SHIPPED | OUTPATIENT
Start: 2022-12-14

## 2022-12-14 NOTE — TELEPHONE ENCOUNTER
Benita Madden is requesting a refill on the following medication(s):  Requested Prescriptions     Pending Prescriptions Disp Refills    colchicine (COLCRYS) 0.6 MG tablet [Pharmacy Med Name: COLCHICINE 0.6 MG TABLET] 30 tablet 5     Sig: take 1 tablet by mouth once daily       Last Visit Date (If Applicable):  0/90/1258    Next Visit Date:    3/20/2023

## 2023-02-19 DIAGNOSIS — E03.9 HYPOTHYROIDISM (ACQUIRED): ICD-10-CM

## 2023-02-20 RX ORDER — LEVOTHYROXINE SODIUM 0.1 MG/1
TABLET ORAL
Qty: 90 TABLET | Refills: 3 | Status: SHIPPED | OUTPATIENT
Start: 2023-02-20

## 2023-02-20 RX ORDER — ATORVASTATIN CALCIUM 10 MG/1
TABLET, FILM COATED ORAL
Qty: 90 TABLET | Refills: 3 | Status: SHIPPED | OUTPATIENT
Start: 2023-02-20

## 2023-02-20 NOTE — TELEPHONE ENCOUNTER
Claudia David is requesting a refill on the following medication(s):  Requested Prescriptions     Pending Prescriptions Disp Refills    levothyroxine (SYNTHROID) 100 MCG tablet [Pharmacy Med Name: LEVOTHYROXINE 100 MCG TABLET] 90 tablet 3     Sig: take 1 tablet by mouth once daily    atorvastatin (LIPITOR) 10 MG tablet [Pharmacy Med Name: ATORVASTATIN 10 MG TABLET] 90 tablet 3     Sig: take 1 tablet by mouth once daily       Last Visit Date (If Applicable):  7/13/1350    Next Visit Date:    3/20/2023

## 2023-02-22 DIAGNOSIS — M1A.9XX1 TOPHI: ICD-10-CM

## 2023-02-22 RX ORDER — ALLOPURINOL 300 MG/1
TABLET ORAL
Qty: 90 TABLET | Refills: 3 | Status: SHIPPED | OUTPATIENT
Start: 2023-02-22

## 2023-02-22 NOTE — TELEPHONE ENCOUNTER
Nithin Ruiz is requesting a refill on the following medication(s):  Requested Prescriptions     Pending Prescriptions Disp Refills    allopurinol (ZYLOPRIM) 300 MG tablet [Pharmacy Med Name: ALLOPURINOL 300 MG TABLET] 90 tablet 3     Sig: take 1 tablet by mouth once daily       Last Visit Date (If Applicable):  0/74/4163    Next Visit Date:    3/20/2023

## 2023-03-21 DIAGNOSIS — M1A.9XX1 TOPHI: ICD-10-CM

## 2023-03-21 RX ORDER — COLCHICINE 0.6 MG/1
0.6 TABLET ORAL DAILY
Qty: 30 TABLET | Refills: 5 | Status: SHIPPED | OUTPATIENT
Start: 2023-03-21

## 2023-03-21 NOTE — TELEPHONE ENCOUNTER
----- Message from Stanley Perez sent at 3/21/2023  9:59 AM EDT -----  Subject: Refill Request    QUESTIONS  Name of Medication? colchicine (COLCRYS) 0.6 MG tablet  Patient-reported dosage and instructions? one tablet daily   How many days do you have left? 5  Preferred Pharmacy? [id number]  Pharmacy phone number (if available)? [phone]  ---------------------------------------------------------------------------  --------------  CALL BACK INFO  What is the best way for the office to contact you? OK to leave message on   voicemail  Preferred Call Back Phone Number? [phone]  ---------------------------------------------------------------------------  --------------  SCRIPT ANSWERS  Relationship to Patient?  Self

## 2023-03-21 NOTE — TELEPHONE ENCOUNTER
Balaji Lopez is requesting a refill on the following medication(s):  Requested Prescriptions     Pending Prescriptions Disp Refills    colchicine (COLCRYS) 0.6 MG tablet 30 tablet 5     Sig: Take 1 tablet by mouth daily       Last Visit Date (If Applicable):  4/66/6672    Next Visit Date:    3/30/2023

## 2023-03-31 RX ORDER — DULOXETIN HYDROCHLORIDE 60 MG/1
CAPSULE, DELAYED RELEASE ORAL
Qty: 90 CAPSULE | Refills: 3 | Status: SHIPPED | OUTPATIENT
Start: 2023-03-31

## 2023-03-31 NOTE — TELEPHONE ENCOUNTER
Mahesh Sanchez is requesting a refill on the following medication(s):  Requested Prescriptions     Pending Prescriptions Disp Refills    DULoxetine (CYMBALTA) 60 MG extended release capsule [Pharmacy Med Name: DULOXETINE HCL DR 60 MG CAP] 90 capsule 3     Sig: take 1 capsule by mouth once daily       Last Visit Date (If Applicable):  6/21/0589    Next Visit Date:    4/5/2023

## 2023-04-12 ENCOUNTER — OFFICE VISIT (OUTPATIENT)
Dept: FAMILY MEDICINE CLINIC | Age: 88
End: 2023-04-12
Payer: MEDICARE

## 2023-04-12 VITALS
HEIGHT: 62 IN | SYSTOLIC BLOOD PRESSURE: 132 MMHG | WEIGHT: 183 LBS | DIASTOLIC BLOOD PRESSURE: 80 MMHG | BODY MASS INDEX: 33.68 KG/M2 | OXYGEN SATURATION: 98 % | HEART RATE: 117 BPM

## 2023-04-12 DIAGNOSIS — G45.9 TIA (TRANSIENT ISCHEMIC ATTACK): ICD-10-CM

## 2023-04-12 DIAGNOSIS — F33.0 MAJOR DEPRESSIVE DISORDER, RECURRENT, MILD (HCC): ICD-10-CM

## 2023-04-12 DIAGNOSIS — M1A.9XX1 TOPHI: ICD-10-CM

## 2023-04-12 DIAGNOSIS — I10 ESSENTIAL HYPERTENSION: ICD-10-CM

## 2023-04-12 DIAGNOSIS — E78.2 MIXED HYPERLIPIDEMIA: ICD-10-CM

## 2023-04-12 DIAGNOSIS — E03.9 HYPOTHYROIDISM (ACQUIRED): Primary | ICD-10-CM

## 2023-04-12 DIAGNOSIS — H81.10 BENIGN PAROXYSMAL POSITIONAL VERTIGO, UNSPECIFIED LATERALITY: ICD-10-CM

## 2023-04-12 LAB
BASOPHILS %: 2.26 (ref 0–3)
BASOPHILS ABSOLUTE: 0.08 (ref 0–0.3)
CHOLESTEROL/HDL RATIO: 2.26 RATIO (ref 0–4.5)
CHOLESTEROL: 147 MG/DL (ref 50–200)
EOSINOPHILS %: 2.67 (ref 0–10)
EOSINOPHILS ABSOLUTE: 0.09 (ref 0–1.1)
HCT VFR BLD CALC: 47.1 % (ref 37–47)
HDLC SERPL-MCNC: 65 MG/DL (ref 36–68)
HEMOGLOBIN: 14.7 (ref 12–16)
LDL CHOLESTEROL CALCULATED: 63.2 MG/DL (ref 0–160)
LYMPHOCYTE %: 51.28 (ref 20–51.1)
LYMPHOCYTES ABSOLUTE: 1.71 (ref 1–5.5)
MCH RBC QN AUTO: 28.1 PG (ref 28.5–32.5)
MCHC RBC AUTO-ENTMCNC: 31.2 G/DL (ref 32–37)
MCV RBC AUTO: 90.2 FL (ref 80–94)
MONOCYTES %: 17.27 (ref 1.7–9.3)
MONOCYTES ABSOLUTE: 0.57 (ref 0.1–1)
NEUTROPHILS %: 26.52 (ref 42.2–75.2)
NEUTROPHILS ABSOLUTE: 0.88 (ref 2–8.1)
PDW BLD-RTO: 15.5 % (ref 8.5–15.5)
PLATELET # BLD: 329.9 THOU/MM3 (ref 130–400)
RBC: 5.22 M/UL (ref 4.2–5.4)
T4 FREE: 1.38 NG/DL (ref 0.78–2.19)
TRIGL SERPL-MCNC: 94 MG/DL (ref 10–250)
TSH SERPL DL<=0.05 MIU/L-ACNC: 1.02 MIU/ML (ref 0.49–4.67)
URIC ACID: 3 MG/DL (ref 3.5–8.5)
VLDLC SERPL CALC-MCNC: 19 MG/DL (ref 0–50)
WBC: 3.3 THOU/ML3 (ref 4.8–10.8)

## 2023-04-12 PROCEDURE — G8427 DOCREV CUR MEDS BY ELIG CLIN: HCPCS | Performed by: FAMILY MEDICINE

## 2023-04-12 PROCEDURE — 1090F PRES/ABSN URINE INCON ASSESS: CPT | Performed by: FAMILY MEDICINE

## 2023-04-12 PROCEDURE — G8417 CALC BMI ABV UP PARAM F/U: HCPCS | Performed by: FAMILY MEDICINE

## 2023-04-12 PROCEDURE — 99214 OFFICE O/P EST MOD 30 MIN: CPT | Performed by: FAMILY MEDICINE

## 2023-04-12 PROCEDURE — 1123F ACP DISCUSS/DSCN MKR DOCD: CPT | Performed by: FAMILY MEDICINE

## 2023-04-12 PROCEDURE — 1036F TOBACCO NON-USER: CPT | Performed by: FAMILY MEDICINE

## 2023-04-12 PROCEDURE — 99211 OFF/OP EST MAY X REQ PHY/QHP: CPT | Performed by: FAMILY MEDICINE

## 2023-04-12 RX ORDER — MECLIZINE HYDROCHLORIDE 25 MG/1
TABLET ORAL
Qty: 30 TABLET | Refills: 1 | Status: SHIPPED | OUTPATIENT
Start: 2023-04-12

## 2023-04-12 SDOH — ECONOMIC STABILITY: FOOD INSECURITY: WITHIN THE PAST 12 MONTHS, THE FOOD YOU BOUGHT JUST DIDN'T LAST AND YOU DIDN'T HAVE MONEY TO GET MORE.: NEVER TRUE

## 2023-04-12 SDOH — ECONOMIC STABILITY: INCOME INSECURITY: HOW HARD IS IT FOR YOU TO PAY FOR THE VERY BASICS LIKE FOOD, HOUSING, MEDICAL CARE, AND HEATING?: NOT HARD AT ALL

## 2023-04-12 SDOH — ECONOMIC STABILITY: HOUSING INSECURITY
IN THE LAST 12 MONTHS, WAS THERE A TIME WHEN YOU DID NOT HAVE A STEADY PLACE TO SLEEP OR SLEPT IN A SHELTER (INCLUDING NOW)?: NO

## 2023-04-12 SDOH — ECONOMIC STABILITY: FOOD INSECURITY: WITHIN THE PAST 12 MONTHS, YOU WORRIED THAT YOUR FOOD WOULD RUN OUT BEFORE YOU GOT MONEY TO BUY MORE.: NEVER TRUE

## 2023-04-12 ASSESSMENT — PATIENT HEALTH QUESTIONNAIRE - PHQ9
SUM OF ALL RESPONSES TO PHQ9 QUESTIONS 1 & 2: 0
SUM OF ALL RESPONSES TO PHQ QUESTIONS 1-9: 0
SUM OF ALL RESPONSES TO PHQ QUESTIONS 1-9: 0
2. FEELING DOWN, DEPRESSED OR HOPELESS: 0
SUM OF ALL RESPONSES TO PHQ QUESTIONS 1-9: 0
SUM OF ALL RESPONSES TO PHQ QUESTIONS 1-9: 0
1. LITTLE INTEREST OR PLEASURE IN DOING THINGS: 0

## 2023-04-16 ENCOUNTER — TELEPHONE (OUTPATIENT)
Dept: FAMILY MEDICINE CLINIC | Age: 88
End: 2023-04-16

## 2024-02-07 DIAGNOSIS — E03.9 HYPOTHYROIDISM (ACQUIRED): ICD-10-CM

## 2024-02-07 RX ORDER — LEVOTHYROXINE SODIUM 0.1 MG/1
TABLET ORAL
Qty: 90 TABLET | Refills: 0 | Status: SHIPPED | OUTPATIENT
Start: 2024-02-07

## 2024-02-07 RX ORDER — ATORVASTATIN CALCIUM 10 MG/1
TABLET, FILM COATED ORAL
Qty: 90 TABLET | Refills: 0 | Status: SHIPPED | OUTPATIENT
Start: 2024-02-07

## 2024-02-07 NOTE — TELEPHONE ENCOUNTER
Frances A Behrman is requesting a refill on the following medication(s):  Requested Prescriptions     Pending Prescriptions Disp Refills    levothyroxine (SYNTHROID) 100 MCG tablet [Pharmacy Med Name: LEVOTHYROXINE 100 MCG TABLET] 90 tablet 3     Sig: take 1 tablet by mouth once daily    atorvastatin (LIPITOR) 10 MG tablet [Pharmacy Med Name: ATORVASTATIN 10 MG TABLET] 90 tablet 3     Sig: take 1 tablet by mouth once daily       Last Visit Date (If Applicable):  4/12/2023    Next Visit Date:    Visit date not found

## 2024-02-14 ENCOUNTER — OFFICE VISIT (OUTPATIENT)
Dept: FAMILY MEDICINE CLINIC | Age: 89
End: 2024-02-14
Payer: MEDICARE

## 2024-02-14 VITALS
HEART RATE: 98 BPM | DIASTOLIC BLOOD PRESSURE: 80 MMHG | BODY MASS INDEX: 33.65 KG/M2 | OXYGEN SATURATION: 100 % | SYSTOLIC BLOOD PRESSURE: 134 MMHG | WEIGHT: 184 LBS

## 2024-02-14 DIAGNOSIS — M1A.9XX1 TOPHI: ICD-10-CM

## 2024-02-14 DIAGNOSIS — I10 ESSENTIAL HYPERTENSION: Primary | ICD-10-CM

## 2024-02-14 DIAGNOSIS — H81.10 BENIGN PAROXYSMAL POSITIONAL VERTIGO, UNSPECIFIED LATERALITY: ICD-10-CM

## 2024-02-14 DIAGNOSIS — F33.0 MAJOR DEPRESSIVE DISORDER, RECURRENT, MILD (HCC): ICD-10-CM

## 2024-02-14 DIAGNOSIS — E03.9 HYPOTHYROIDISM (ACQUIRED): ICD-10-CM

## 2024-02-14 DIAGNOSIS — E78.2 MIXED HYPERLIPIDEMIA: ICD-10-CM

## 2024-02-14 PROCEDURE — G2211 COMPLEX E/M VISIT ADD ON: HCPCS | Performed by: FAMILY MEDICINE

## 2024-02-14 PROCEDURE — G8427 DOCREV CUR MEDS BY ELIG CLIN: HCPCS | Performed by: FAMILY MEDICINE

## 2024-02-14 PROCEDURE — 99214 OFFICE O/P EST MOD 30 MIN: CPT | Performed by: FAMILY MEDICINE

## 2024-02-14 PROCEDURE — G8417 CALC BMI ABV UP PARAM F/U: HCPCS | Performed by: FAMILY MEDICINE

## 2024-02-14 PROCEDURE — 99212 OFFICE O/P EST SF 10 MIN: CPT | Performed by: FAMILY MEDICINE

## 2024-02-14 PROCEDURE — 1036F TOBACCO NON-USER: CPT | Performed by: FAMILY MEDICINE

## 2024-02-14 PROCEDURE — G8484 FLU IMMUNIZE NO ADMIN: HCPCS | Performed by: FAMILY MEDICINE

## 2024-02-14 PROCEDURE — 1090F PRES/ABSN URINE INCON ASSESS: CPT | Performed by: FAMILY MEDICINE

## 2024-02-14 PROCEDURE — 1123F ACP DISCUSS/DSCN MKR DOCD: CPT | Performed by: FAMILY MEDICINE

## 2024-02-14 RX ORDER — ALLOPURINOL 300 MG/1
300 TABLET ORAL DAILY
Qty: 90 TABLET | Refills: 3 | Status: SHIPPED | OUTPATIENT
Start: 2024-02-14

## 2024-02-14 RX ORDER — LEVOTHYROXINE SODIUM 0.1 MG/1
100 TABLET ORAL DAILY
Qty: 90 TABLET | Refills: 0 | Status: SHIPPED | OUTPATIENT
Start: 2024-02-14

## 2024-02-14 RX ORDER — ATORVASTATIN CALCIUM 10 MG/1
10 TABLET, FILM COATED ORAL DAILY
Qty: 90 TABLET | Refills: 1 | Status: SHIPPED | OUTPATIENT
Start: 2024-02-14

## 2024-02-14 RX ORDER — MECLIZINE HYDROCHLORIDE 25 MG/1
TABLET ORAL
Qty: 30 TABLET | Refills: 1 | Status: SHIPPED | OUTPATIENT
Start: 2024-02-14

## 2024-02-14 RX ORDER — DULOXETIN HYDROCHLORIDE 60 MG/1
60 CAPSULE, DELAYED RELEASE ORAL DAILY
Qty: 90 CAPSULE | Refills: 3 | Status: SHIPPED | OUTPATIENT
Start: 2024-02-14

## 2024-02-14 ASSESSMENT — PATIENT HEALTH QUESTIONNAIRE - PHQ9
3. TROUBLE FALLING OR STAYING ASLEEP: 0
6. FEELING BAD ABOUT YOURSELF - OR THAT YOU ARE A FAILURE OR HAVE LET YOURSELF OR YOUR FAMILY DOWN: 0
7. TROUBLE CONCENTRATING ON THINGS, SUCH AS READING THE NEWSPAPER OR WATCHING TELEVISION: 0
5. POOR APPETITE OR OVEREATING: 0
8. MOVING OR SPEAKING SO SLOWLY THAT OTHER PEOPLE COULD HAVE NOTICED. OR THE OPPOSITE, BEING SO FIGETY OR RESTLESS THAT YOU HAVE BEEN MOVING AROUND A LOT MORE THAN USUAL: 0
1. LITTLE INTEREST OR PLEASURE IN DOING THINGS: 0
SUM OF ALL RESPONSES TO PHQ QUESTIONS 1-9: 1
9. THOUGHTS THAT YOU WOULD BE BETTER OFF DEAD, OR OF HURTING YOURSELF: 0
SUM OF ALL RESPONSES TO PHQ QUESTIONS 1-9: 1
SUM OF ALL RESPONSES TO PHQ9 QUESTIONS 1 & 2: 0
2. FEELING DOWN, DEPRESSED OR HOPELESS: 0
SUM OF ALL RESPONSES TO PHQ QUESTIONS 1-9: 1
SUM OF ALL RESPONSES TO PHQ QUESTIONS 1-9: 1
4. FEELING TIRED OR HAVING LITTLE ENERGY: 1

## 2024-02-23 ENCOUNTER — TELEPHONE (OUTPATIENT)
Dept: FAMILY MEDICINE CLINIC | Age: 89
End: 2024-02-23

## 2024-02-23 DIAGNOSIS — M1A.9XX1 TOPHI: Primary | ICD-10-CM

## 2024-02-23 DIAGNOSIS — D70.2 OTHER DRUG-INDUCED NEUTROPENIA (HCC): ICD-10-CM

## 2024-02-23 DIAGNOSIS — M1A.0490 CHRONIC GOUT OF HAND, UNSPECIFIED CAUSE, UNSPECIFIED LATERALITY: ICD-10-CM

## 2024-02-23 LAB
ALBUMIN/GLOBULIN RATIO: 1 G/DL
ALBUMIN: 3.8 G/DL (ref 3.5–5)
ALP BLD-CCNC: 118 UNITS/L (ref 38–126)
ALT SERPL-CCNC: 32 UNITS/L (ref 4–35)
ANION GAP SERPL CALCULATED.3IONS-SCNC: 4.9 MMOL/L (ref 3–11)
AST SERPL-CCNC: 43 UNITS/L (ref 14–36)
BASOPHILS %: 2 (ref 0–3)
BASOPHILS ABSOLUTE: 0.06 (ref 0–0.3)
BILIRUB SERPL-MCNC: 0.6 MG/DL (ref 0.2–1.3)
BUN BLDV-MCNC: 22 MG/DL (ref 7–17)
CALCIUM SERPL-MCNC: 9.8 MG/DL (ref 8.4–10.2)
CHLORIDE BLD-SCNC: 109 MMOL/L (ref 98–120)
CHOLESTEROL/HDL RATIO: 2 RATIO (ref 0–4.5)
CHOLESTEROL: 156 MG/DL (ref 50–200)
CO2: 29 MMOL/L (ref 22–31)
CREAT SERPL-MCNC: 0.7 MG/DL (ref 0.5–1)
EOSINOPHILS %: 3.5 (ref 0–10)
EOSINOPHILS ABSOLUTE: 0.1 (ref 0–1.1)
GFR CALCULATED: > 60
GLOBULIN: 3.7 G/DL
GLUCOSE: 90 MG/DL (ref 65–105)
HCT VFR BLD CALC: 49.5 % (ref 37–47)
HDLC SERPL-MCNC: 66 MG/DL (ref 36–68)
HEMOGLOBIN: 14.8 (ref 12–16)
LDL CHOLESTEROL CALCULATED: 64.8 MG/DL (ref 0–160)
LYMPHOCYTE %: 58.61 (ref 20–51.1)
LYMPHOCYTES ABSOLUTE: 1.66 (ref 1–5.5)
MCH RBC QN AUTO: 28.1 PG (ref 28.5–32.5)
MCHC RBC AUTO-ENTMCNC: 29.9 G/DL (ref 32–37)
MCV RBC AUTO: 93.9 FL (ref 80–94)
MONOCYTES %: 12.11 (ref 1.7–9.3)
MONOCYTES ABSOLUTE: 0.34 (ref 0.1–1)
NEUTROPHILS %: 23.79 (ref 42.2–75.2)
NEUTROPHILS ABSOLUTE: 0.67 (ref 2–8.1)
PDW BLD-RTO: 14.8 % (ref 8.5–15.5)
PLATELET # BLD: 307.3 THOU/MM3 (ref 130–400)
POTASSIUM SERPL-SCNC: 4.6 MMOL/L (ref 3.6–5)
RBC: 5.27 M/UL (ref 4.2–5.4)
SODIUM BLD-SCNC: 144 MMOL/L (ref 135–145)
T4 FREE: 1.14 NG/DL (ref 0.78–2.19)
TOTAL PROTEIN, SERUM: 7.5 G/DL (ref 6.3–8.2)
TRIGL SERPL-MCNC: 126 MG/DL (ref 10–250)
TSH SERPL DL<=0.05 MIU/L-ACNC: 2.98 MIU/ML (ref 0.49–4.67)
VLDLC SERPL CALC-MCNC: 25 MG/DL (ref 0–50)
WBC: 2.8 THOU/ML3 (ref 4.8–10.8)

## 2024-02-23 NOTE — TELEPHONE ENCOUNTER
Notify Hector that her labs are normal with the exception of the white blood count being mildly suppressed.  This is slightly worse than it was about a year ago.  Unfortunately this can be a side effect of the allopurinol.  Because it has gotten a little bit worse I would like her to stop the allopurinol and start on a different medication called febuxostat would make sure she continues her baby aspirin daily while taking this medication.  I would like to recheck the CBC again in about 3 months.  Order placed.

## 2024-02-26 RX ORDER — FEBUXOSTAT 40 MG/1
40 TABLET, FILM COATED ORAL DAILY
Qty: 30 TABLET | Refills: 5 | Status: SHIPPED | OUTPATIENT
Start: 2024-02-26

## 2024-05-16 ENCOUNTER — TELEPHONE (OUTPATIENT)
Dept: FAMILY MEDICINE CLINIC | Age: 89
End: 2024-05-16

## 2024-05-16 DIAGNOSIS — E03.9 HYPOTHYROIDISM (ACQUIRED): ICD-10-CM

## 2024-05-16 RX ORDER — LEVOTHYROXINE SODIUM 0.1 MG/1
100 TABLET ORAL DAILY
Qty: 90 TABLET | Refills: 3 | Status: SHIPPED | OUTPATIENT
Start: 2024-05-16

## 2024-05-16 NOTE — TELEPHONE ENCOUNTER
Frances A Behrman is requesting a refill on the following medication(s):  Requested Prescriptions     Pending Prescriptions Disp Refills    levothyroxine (SYNTHROID) 100 MCG tablet 90 tablet 0     Sig: Take 1 tablet by mouth daily       Last Visit Date (If Applicable):  2/14/2024    Next Visit Date:    8/14/2024

## 2024-10-30 ENCOUNTER — OFFICE VISIT (OUTPATIENT)
Dept: FAMILY MEDICINE CLINIC | Age: 89
End: 2024-10-30

## 2024-10-30 DIAGNOSIS — H81.10 BENIGN PAROXYSMAL POSITIONAL VERTIGO, UNSPECIFIED LATERALITY: ICD-10-CM

## 2024-10-30 DIAGNOSIS — E78.2 MIXED HYPERLIPIDEMIA: ICD-10-CM

## 2024-10-30 DIAGNOSIS — E03.9 HYPOTHYROIDISM (ACQUIRED): ICD-10-CM

## 2024-10-30 DIAGNOSIS — Z00.00 MEDICARE ANNUAL WELLNESS VISIT, SUBSEQUENT: Primary | ICD-10-CM

## 2024-10-30 RX ORDER — LEVOTHYROXINE SODIUM 100 UG/1
100 TABLET ORAL DAILY
Qty: 90 TABLET | Refills: 3 | Status: SHIPPED | OUTPATIENT
Start: 2024-10-30

## 2024-10-30 RX ORDER — ATORVASTATIN CALCIUM 10 MG/1
10 TABLET, FILM COATED ORAL DAILY
Qty: 90 TABLET | Refills: 3 | Status: SHIPPED | OUTPATIENT
Start: 2024-10-30

## 2024-10-30 RX ORDER — CALCIUM CARBONATE 500(1250)
500 TABLET ORAL DAILY
COMMUNITY

## 2024-10-30 RX ORDER — MECLIZINE HYDROCHLORIDE 25 MG/1
TABLET ORAL
Qty: 30 TABLET | Refills: 1 | Status: SHIPPED | OUTPATIENT
Start: 2024-10-30

## 2024-10-30 SDOH — ECONOMIC STABILITY: FOOD INSECURITY: WITHIN THE PAST 12 MONTHS, THE FOOD YOU BOUGHT JUST DIDN'T LAST AND YOU DIDN'T HAVE MONEY TO GET MORE.: NEVER TRUE

## 2024-10-30 SDOH — ECONOMIC STABILITY: INCOME INSECURITY: HOW HARD IS IT FOR YOU TO PAY FOR THE VERY BASICS LIKE FOOD, HOUSING, MEDICAL CARE, AND HEATING?: NOT HARD AT ALL

## 2024-10-30 SDOH — ECONOMIC STABILITY: FOOD INSECURITY: WITHIN THE PAST 12 MONTHS, YOU WORRIED THAT YOUR FOOD WOULD RUN OUT BEFORE YOU GOT MONEY TO BUY MORE.: NEVER TRUE

## 2024-10-30 ASSESSMENT — PATIENT HEALTH QUESTIONNAIRE - PHQ9
6. FEELING BAD ABOUT YOURSELF - OR THAT YOU ARE A FAILURE OR HAVE LET YOURSELF OR YOUR FAMILY DOWN: NOT AT ALL
4. FEELING TIRED OR HAVING LITTLE ENERGY: SEVERAL DAYS
SUM OF ALL RESPONSES TO PHQ QUESTIONS 1-9: 2
SUM OF ALL RESPONSES TO PHQ9 QUESTIONS 1 & 2: 0
2. FEELING DOWN, DEPRESSED OR HOPELESS: NOT AT ALL
8. MOVING OR SPEAKING SO SLOWLY THAT OTHER PEOPLE COULD HAVE NOTICED. OR THE OPPOSITE, BEING SO FIGETY OR RESTLESS THAT YOU HAVE BEEN MOVING AROUND A LOT MORE THAN USUAL: NOT AT ALL
9. THOUGHTS THAT YOU WOULD BE BETTER OFF DEAD, OR OF HURTING YOURSELF: NOT AT ALL
10. IF YOU CHECKED OFF ANY PROBLEMS, HOW DIFFICULT HAVE THESE PROBLEMS MADE IT FOR YOU TO DO YOUR WORK, TAKE CARE OF THINGS AT HOME, OR GET ALONG WITH OTHER PEOPLE: NOT DIFFICULT AT ALL
SUM OF ALL RESPONSES TO PHQ QUESTIONS 1-9: 2
5. POOR APPETITE OR OVEREATING: NOT AT ALL
SUM OF ALL RESPONSES TO PHQ QUESTIONS 1-9: 2
7. TROUBLE CONCENTRATING ON THINGS, SUCH AS READING THE NEWSPAPER OR WATCHING TELEVISION: NOT AT ALL
SUM OF ALL RESPONSES TO PHQ QUESTIONS 1-9: 2
3. TROUBLE FALLING OR STAYING ASLEEP: SEVERAL DAYS
1. LITTLE INTEREST OR PLEASURE IN DOING THINGS: NOT AT ALL

## 2024-10-30 ASSESSMENT — LIFESTYLE VARIABLES
HOW MANY STANDARD DRINKS CONTAINING ALCOHOL DO YOU HAVE ON A TYPICAL DAY: PATIENT DOES NOT DRINK
HOW OFTEN DO YOU HAVE A DRINK CONTAINING ALCOHOL: NEVER

## 2024-10-30 NOTE — TELEPHONE ENCOUNTER
Frances A Behrman is requesting a refill on the following medication(s):  Requested Prescriptions     Pending Prescriptions Disp Refills    levothyroxine (SYNTHROID) 100 MCG tablet 90 tablet 3     Sig: Take 1 tablet by mouth daily    meclizine (ANTIVERT) 25 MG tablet 30 tablet 1     Sig: take 1 tablet by mouth three times a day if needed for dizziness    atorvastatin (LIPITOR) 10 MG tablet 90 tablet 3     Sig: Take 1 tablet by mouth daily       Last Visit Date (If Applicable):  10/30/2024    Next Visit Date:    Visit date not found

## 2024-10-30 NOTE — PATIENT INSTRUCTIONS
care team, or counselor.  People with the same health concern.  Your local Protestant, Mormon, Mosque, or other Anabaptism group.  A city, state, or national group that provides support for your health concern. Check your local library or community center for a list of these groups. Or look for information online.  Your local community, friends, and family.  Supportive relationships  A supportive relationship includes emotional support such as love, trust, and understanding, as well as advice and concrete help, such as help managing your time.  Reach out to others  Family and friends can help you. Ask them to:  Listen to you and give you encouragement. This can keep you from feeling hopeless or alone.  Help with small daily tasks or with bigger problems. A helping hand can keep you from feeling overwhelmed.  Help you manage a health problem. For example, ask them to go to doctor visits with you. Your loved ones can offer support by being involved in your medical care.  Respect your relationships  A good relationship is also a two-way street. You count on help from others, but they also count on you.  Know your friends' limits. You don't have to see or call your friends every day. If you are going through a rough patch, ask friends if you can contact them outside of the usual boundaries.  Don't always complain or talk about yourself. Know when it's time to stop talking and listen or just enjoy your friend's company.  Know that good friends can be a bad influence. For example, if a friend encourages you to drink when you know it will harm you, you may want to end the friendship.  Where can you learn more?  Go to https://www.[employer].net/patientEd and enter G092 to learn more about \"Learning About Emotional Support.\"  Current as of: June 24, 2023  Content Version: 14.2  © 2024 [employer].   Care instructions adapted under license by [employer]. If you have questions about a medical condition or this

## 2024-10-30 NOTE — PROGRESS NOTES
HM care gaps reviewed. Patient states she does get the flu vaccine and plans to get it this year. Advised that she can get it at this office, pharmacy, or health department. She declines covid vaccine.

## 2024-10-30 NOTE — PROGRESS NOTES
Medicare Annual Wellness Visit    Frances A Behrman is here for Medicare AWV    Assessment & Plan   Medicare annual wellness visit, subsequent    Recommendations for Preventive Services Due: see orders and patient instructions/AVS.  Recommended screening schedule for the next 5-10 years is provided to the patient in written form: see Patient Instructions/AVS.     Return in 1 year (on 10/30/2025) for Medicare AWV.     Subjective       Patient's complete Health Risk Assessment and screening values have been reviewed and are found in Flowsheets. The following problems were reviewed today and where indicated follow up appointments were made and/or referrals ordered.    Positive Risk Factor Screenings with Interventions:               General HRA Questions:  Select all that apply: (!) Loneliness, Social Isolation (at times)  Interventions - Loneliness:  Patient declined any further interventions or treatment  Patient states she does get lonely sometimes. 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   Interventions - Social Isolation:  Patient states she does get lonely sometimes. 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       Inactivity:  On average, how many days per week do you engage in moderate to strenuous exercise (like a brisk walk)?: 0 days (!) Abnormal  On average, how many minutes do you engage in exercise at this level?: 0 min  Interventions:  Patient comments: Patient states she does not exercise but does walk around frequently in her house. She does always use her walker for safety.       Abnormal BMI (obese):  There is no height or weight on file to calculate BMI. (!) Abnormal  Interventions:  Patient does eat healthy meals - she uses meals on wheels. She tried to stay active inside her house. She walks around her house frequently.          Hearing Screen:  Do you or your family notice any trouble with your hearing that hasn't been managed with hearing aids?: (!)

## 2024-12-26 DIAGNOSIS — M1A.9XX1 TOPHI: ICD-10-CM

## 2024-12-27 NOTE — TELEPHONE ENCOUNTER
Frances A Behrman is requesting a refill on the following medication(s):  Requested Prescriptions     Pending Prescriptions Disp Refills    allopurinol (ZYLOPRIM) 300 MG tablet [Pharmacy Med Name: Allopurinol 300 MG Oral Tablet] 30 tablet 0     Sig: TAKE ONE TABLET BY MOUTH ONCE DAILY       Last Visit Date (If Applicable):  10/30/2024    Next Visit Date:    Visit date not found

## 2024-12-28 RX ORDER — ALLOPURINOL 300 MG/1
300 TABLET ORAL DAILY
Qty: 30 TABLET | Refills: 5 | Status: SHIPPED | OUTPATIENT
Start: 2024-12-28

## 2025-01-17 DIAGNOSIS — F33.0 MAJOR DEPRESSIVE DISORDER, RECURRENT, MILD (HCC): ICD-10-CM

## 2025-01-17 DIAGNOSIS — H81.10 BENIGN PAROXYSMAL POSITIONAL VERTIGO, UNSPECIFIED LATERALITY: ICD-10-CM

## 2025-01-17 RX ORDER — MECLIZINE HYDROCHLORIDE 25 MG/1
TABLET ORAL
Qty: 30 TABLET | Refills: 0 | Status: SHIPPED | OUTPATIENT
Start: 2025-01-17

## 2025-01-17 RX ORDER — DULOXETIN HYDROCHLORIDE 60 MG/1
60 CAPSULE, DELAYED RELEASE ORAL DAILY
Qty: 90 CAPSULE | Refills: 3 | Status: SHIPPED | OUTPATIENT
Start: 2025-01-17

## 2025-01-17 NOTE — TELEPHONE ENCOUNTER
Frances A Behrman is calling to request a refill on the following medication(s):  Requested Prescriptions     Pending Prescriptions Disp Refills    meclizine (ANTIVERT) 25 MG tablet [Pharmacy Med Name: Meclizine HCl 25 MG Oral Tablet] 30 tablet 0     Sig: TAKE 1 TABLET BY MOUTH THREE TIMES DAILY AS NEEDED FOR DIZZINESS       Last Visit Date (If Applicable):  10/30/2024    Next Visit Date:    1/17/2025

## 2025-01-17 NOTE — TELEPHONE ENCOUNTER
Frances A Behrman is calling to request a refill on the following medication(s):  Requested Prescriptions     Pending Prescriptions Disp Refills    DULoxetine (CYMBALTA) 60 MG extended release capsule 90 capsule 3     Sig: Take 1 capsule by mouth daily       Last Visit Date (If Applicable):  10/30/2024    Next Visit Date:    Visit date not found

## 2025-06-05 DIAGNOSIS — H81.10 BENIGN PAROXYSMAL POSITIONAL VERTIGO, UNSPECIFIED LATERALITY: ICD-10-CM

## 2025-06-05 RX ORDER — MECLIZINE HYDROCHLORIDE 25 MG/1
25 TABLET ORAL 3 TIMES DAILY PRN
Qty: 30 TABLET | Refills: 1 | Status: SHIPPED | OUTPATIENT
Start: 2025-06-05

## 2025-06-05 NOTE — TELEPHONE ENCOUNTER
Frances A Behrman is requesting a refill on the following medication(s):  Requested Prescriptions     Pending Prescriptions Disp Refills    meclizine (ANTIVERT) 25 MG tablet [Pharmacy Med Name: Meclizine HCl 25 MG Oral Tablet] 30 tablet 0     Sig: TAKE 1 TABLET BY MOUTH THREE TIMES DAILY AS NEEDED FOR DIZZINESS       Last Visit Date (If Applicable):  10/30/2024    Next Visit Date:    Visit date not found

## 2025-06-15 DIAGNOSIS — M1A.9XX1 TOPHI: ICD-10-CM

## 2025-06-16 DIAGNOSIS — M1A.9XX1 TOPHI: ICD-10-CM

## 2025-06-16 RX ORDER — ALLOPURINOL 300 MG/1
300 TABLET ORAL DAILY
Qty: 60 TABLET | Refills: 5 | Status: SHIPPED | OUTPATIENT
Start: 2025-06-16

## 2025-06-16 RX ORDER — ALLOPURINOL 300 MG/1
300 TABLET ORAL DAILY
Qty: 30 TABLET | Refills: 5 | OUTPATIENT
Start: 2025-06-16

## 2025-06-16 NOTE — TELEPHONE ENCOUNTER
Frances A Behrman is requesting a refill on the following medication(s):  Requested Prescriptions      No prescriptions requested or ordered in this encounter       Last Visit Date (If Applicable):  10/30/2024    Next Visit Date:    Visit date not found

## 2025-06-16 NOTE — TELEPHONE ENCOUNTER
Frances A Behrman is requesting a refill on the following medication(s):  Requested Prescriptions     Pending Prescriptions Disp Refills    allopurinol (ZYLOPRIM) 300 MG tablet [Pharmacy Med Name: Allopurinol 300 MG Oral Tablet] 60 tablet 0     Sig: Take 1 tablet by mouth once daily       Last Visit Date (If Applicable):  10/30/2024      Next Visit Date:    6/16/2025

## 2025-07-17 DIAGNOSIS — H81.10 BENIGN PAROXYSMAL POSITIONAL VERTIGO, UNSPECIFIED LATERALITY: ICD-10-CM

## 2025-07-21 NOTE — TELEPHONE ENCOUNTER
Spoke with patient and patient is aware she needs an appointment to get refills and will call to schedule an appointment when she is ready.

## 2025-07-23 RX ORDER — MECLIZINE HYDROCHLORIDE 25 MG/1
25 TABLET ORAL 3 TIMES DAILY PRN
Qty: 30 TABLET | Refills: 0 | Status: SHIPPED | OUTPATIENT
Start: 2025-07-23

## 2025-08-05 DIAGNOSIS — E03.9 HYPOTHYROIDISM (ACQUIRED): ICD-10-CM

## 2025-08-06 RX ORDER — LEVOTHYROXINE SODIUM 100 UG/1
100 TABLET ORAL DAILY
Qty: 90 TABLET | Refills: 3 | Status: SHIPPED | OUTPATIENT
Start: 2025-08-06